# Patient Record
Sex: FEMALE | Race: WHITE | NOT HISPANIC OR LATINO | Employment: OTHER | URBAN - METROPOLITAN AREA
[De-identification: names, ages, dates, MRNs, and addresses within clinical notes are randomized per-mention and may not be internally consistent; named-entity substitution may affect disease eponyms.]

---

## 2017-05-02 ENCOUNTER — HOSPITAL ENCOUNTER (OUTPATIENT)
Dept: PAIN MEDICINE | Facility: HOSPITAL | Age: 62
Discharge: HOME | End: 2017-05-02
Attending: PHYSICAL MEDICINE & REHABILITATION | Admitting: PHYSICAL MEDICINE & REHABILITATION

## 2017-05-02 LAB
AMPHETAMINES UR QL SCN: NEGATIVE
BZE UR QL SCN: NEGATIVE
CREAT 24H UR-MCNC: NORMAL MG/DL
METHADONE UR QL SCN: NEGATIVE
OPIATE CONFIRMATION URINE: NORMAL
THC SERPLBLD CFM-MCNC: NEGATIVE NG/ML

## 2017-05-30 ENCOUNTER — HOSPITAL ENCOUNTER (OUTPATIENT)
Dept: PAIN MEDICINE | Facility: HOSPITAL | Age: 62
Discharge: HOME | End: 2017-05-30
Attending: PHYSICAL MEDICINE & REHABILITATION | Admitting: PHYSICAL MEDICINE & REHABILITATION

## 2017-07-18 ENCOUNTER — HOSPITAL ENCOUNTER (OUTPATIENT)
Dept: PAIN MEDICINE | Facility: HOSPITAL | Age: 62
Discharge: HOME | End: 2017-07-18
Attending: PHYSICAL MEDICINE & REHABILITATION | Admitting: PHYSICAL MEDICINE & REHABILITATION

## 2017-09-12 ENCOUNTER — HOSPITAL ENCOUNTER (OUTPATIENT)
Dept: PAIN MEDICINE | Facility: HOSPITAL | Age: 62
Discharge: HOME | End: 2017-09-12
Attending: PHYSICAL MEDICINE & REHABILITATION | Admitting: PHYSICAL MEDICINE & REHABILITATION

## 2017-11-21 ENCOUNTER — HOSPITAL ENCOUNTER (OUTPATIENT)
Dept: PAIN MEDICINE | Facility: HOSPITAL | Age: 62
Discharge: HOME | End: 2017-11-21
Attending: PHYSICAL MEDICINE & REHABILITATION | Admitting: PHYSICAL MEDICINE & REHABILITATION

## 2018-01-30 ENCOUNTER — HOSPITAL ENCOUNTER (OUTPATIENT)
Dept: PAIN MEDICINE | Facility: HOSPITAL | Age: 63
Discharge: HOME | End: 2018-01-30
Attending: PHYSICAL MEDICINE & REHABILITATION | Admitting: PHYSICAL MEDICINE & REHABILITATION

## 2018-04-03 ENCOUNTER — HOSPITAL ENCOUNTER (OUTPATIENT)
Dept: PAIN MEDICINE | Facility: HOSPITAL | Age: 63
Discharge: HOME | End: 2018-04-03
Attending: PHYSICAL MEDICINE & REHABILITATION | Admitting: PHYSICAL MEDICINE & REHABILITATION

## 2018-06-05 ENCOUNTER — HOSPITAL ENCOUNTER (OUTPATIENT)
Dept: PAIN MEDICINE | Facility: HOSPITAL | Age: 63
Discharge: HOME | End: 2018-06-05
Attending: PHYSICAL MEDICINE & REHABILITATION | Admitting: PHYSICAL MEDICINE & REHABILITATION

## 2018-08-02 ENCOUNTER — HOSPITAL ENCOUNTER (OUTPATIENT)
Dept: PAIN MEDICINE | Facility: HOSPITAL | Age: 63
Discharge: HOME | End: 2018-08-02
Attending: PHYSICAL MEDICINE & REHABILITATION | Admitting: PHYSICAL MEDICINE & REHABILITATION

## 2018-10-04 ENCOUNTER — HOSPITAL ENCOUNTER (OUTPATIENT)
Dept: PAIN MEDICINE | Facility: HOSPITAL | Age: 63
Discharge: HOME | End: 2018-10-04
Attending: PHYSICAL MEDICINE & REHABILITATION | Admitting: PHYSICAL MEDICINE & REHABILITATION

## 2018-10-04 LAB
AMPHETAMINES UR QL SCN: NEGATIVE
BARBITURATES UR QL SCN: NEGATIVE
BENZODIAZ UR QL SCN: NEGATIVE
BZE UR QL SCN: NEGATIVE
CREAT 24H UR-MCNC: 26.8 MG/DL
METHADONE UR QL SCN: NEGATIVE
OPIATE CONFIRMATION URINE: ABNORMAL
OPIATES TESTED UR SCN: ABNORMAL
PCP UR QL: NEGATIVE
THC SERPLBLD CFM-MCNC: NEGATIVE NG/ML

## 2019-01-03 ENCOUNTER — HOSPITAL ENCOUNTER (OUTPATIENT)
Dept: PAIN MEDICINE | Facility: HOSPITAL | Age: 64
Discharge: HOME | End: 2019-01-03
Attending: PHYSICAL MEDICINE & REHABILITATION | Admitting: PHYSICAL MEDICINE & REHABILITATION

## 2019-04-01 ENCOUNTER — HOSPITAL ENCOUNTER (OUTPATIENT)
Dept: PAIN MEDICINE | Facility: HOSPITAL | Age: 64
Discharge: HOME | End: 2019-04-01
Attending: PHYSICAL MEDICINE & REHABILITATION | Admitting: PHYSICAL MEDICINE & REHABILITATION

## 2019-07-08 ENCOUNTER — OFFICE VISIT (OUTPATIENT)
Dept: PAIN MEDICINE | Facility: CLINIC | Age: 64
End: 2019-07-08

## 2019-07-08 VITALS
BODY MASS INDEX: 24.44 KG/M2 | RESPIRATION RATE: 16 BRPM | TEMPERATURE: 98.1 F | DIASTOLIC BLOOD PRESSURE: 76 MMHG | OXYGEN SATURATION: 97 % | SYSTOLIC BLOOD PRESSURE: 126 MMHG | HEART RATE: 53 BPM | WEIGHT: 165 LBS | HEIGHT: 69 IN

## 2019-07-08 DIAGNOSIS — G62.9 PERIPHERAL POLYNEUROPATHY: ICD-10-CM

## 2019-07-08 DIAGNOSIS — M53.3 SACROILIAC DYSFUNCTION: Primary | ICD-10-CM

## 2019-07-08 DIAGNOSIS — M79.672 PAIN IN BOTH FEET: ICD-10-CM

## 2019-07-08 DIAGNOSIS — Z79.899 OTHER LONG TERM (CURRENT) DRUG THERAPY: ICD-10-CM

## 2019-07-08 DIAGNOSIS — M79.604 LEG PAIN, BILATERAL: ICD-10-CM

## 2019-07-08 DIAGNOSIS — M70.62 GREATER TROCHANTERIC BURSITIS OF BOTH HIPS: ICD-10-CM

## 2019-07-08 DIAGNOSIS — M70.61 GREATER TROCHANTERIC BURSITIS OF BOTH HIPS: ICD-10-CM

## 2019-07-08 DIAGNOSIS — M79.671 PAIN IN BOTH FEET: ICD-10-CM

## 2019-07-08 DIAGNOSIS — M79.605 LEG PAIN, BILATERAL: ICD-10-CM

## 2019-07-08 PROBLEM — M79.673 PAIN OF FOOT: Status: ACTIVE | Noted: 2017-05-02

## 2019-07-08 PROCEDURE — 99213 OFFICE O/P EST LOW 20 MIN: CPT | Performed by: PHYSICAL MEDICINE & REHABILITATION

## 2019-07-08 PROCEDURE — G0463 HOSPITAL OUTPT CLINIC VISIT: HCPCS | Performed by: PHYSICAL MEDICINE & REHABILITATION

## 2019-07-08 RX ORDER — HYDROCODONE BITARTRATE AND ACETAMINOPHEN 10; 325 MG/1; MG/1
1 TABLET ORAL EVERY 6 HOURS PRN
Qty: 120 TABLET | Refills: 0 | Status: SHIPPED | OUTPATIENT
Start: 2019-07-08 | End: 2019-07-08 | Stop reason: SDUPTHER

## 2019-07-08 RX ORDER — CITALOPRAM 20 MG/1
TABLET ORAL
COMMUNITY
Start: 2019-07-05

## 2019-07-08 RX ORDER — PROMETHAZINE HYDROCHLORIDE 25 MG/1
25 TABLET ORAL
COMMUNITY
End: 2019-09-10

## 2019-07-08 RX ORDER — HYDROCODONE BITARTRATE AND ACETAMINOPHEN 10; 325 MG/1; MG/1
1 TABLET ORAL EVERY 6 HOURS PRN
Qty: 120 TABLET | Refills: 0 | Status: SHIPPED | OUTPATIENT
Start: 2019-07-08 | End: 2019-09-10 | Stop reason: SDUPTHER

## 2019-07-08 RX ORDER — BIOTIN 2500 MCG
CAPSULE ORAL
COMMUNITY
Start: 2017-04-27

## 2019-07-08 RX ORDER — DULOXETIN HYDROCHLORIDE 60 MG/1
CAPSULE, DELAYED RELEASE ORAL
COMMUNITY
Start: 2017-04-27

## 2019-07-08 RX ORDER — SODIUM CHLORIDE 5 %
OINTMENT (GRAM) OPHTHALMIC (EYE)
COMMUNITY
Start: 2018-06-05

## 2019-07-08 RX ORDER — GABAPENTIN 600 MG/1
TABLET ORAL
COMMUNITY
Start: 2017-04-27 | End: 2021-02-26 | Stop reason: SDUPTHER

## 2019-07-08 RX ORDER — SIMETHICONE 125 MG
CAPSULE ORAL
COMMUNITY
End: 2019-11-26 | Stop reason: ALTCHOICE

## 2019-07-08 RX ORDER — SALICYLIC ACID
POWDER (GRAM) MISCELLANEOUS ONCE
COMMUNITY
End: 2019-11-26 | Stop reason: ALTCHOICE

## 2019-07-08 RX ORDER — HYDROCODONE BITARTRATE AND ACETAMINOPHEN 10; 325 MG/1; MG/1
TABLET ORAL
COMMUNITY
Start: 2017-04-27 | End: 2019-09-10

## 2019-07-08 RX ORDER — OMEPRAZOLE 40 MG/1
CAPSULE, DELAYED RELEASE ORAL
COMMUNITY
Start: 2017-04-27 | End: 2021-08-27

## 2019-07-08 NOTE — PROGRESS NOTES
Subjective   Angella Srivastava is a 64 y.o. female.     Bilateral feet pain, also posterior leg pain, LLE > RLE. Dz with peripheral neuropathy in 2005, 10/10 at worst, 5/10 at best, 3/10 today, burning, cold, pins & needles, always present, varies, worst in morning and at night, interferes with all activities that involve walking. Had NCS with PN. Started on Darvocet in past, taking Norco 10/325mg about QID prn with benefit at initial visit. Lyrica helped but caused severe cramping, taking Gabapentin 600mg QID at initial visit, also Cymbalta. Referred for pain management. Continued Norco, Gabapentin at 1200mg TID. Norco not lasting 6 hours, but she prefers to stay with Norco. Started CBD oil, helps thumb pain.          The following portions of the patient's history were reviewed and updated as appropriate: allergies, current medications, past family history, past medical history, past social history, past surgical history and problem list.    Review of Systems   Constitutional: Negative for chills, fatigue and fever.   HENT: Negative for hearing loss and trouble swallowing.    Eyes: Negative for visual disturbance.   Respiratory: Negative for shortness of breath.    Cardiovascular: Negative for chest pain.   Gastrointestinal: Negative for abdominal pain, constipation, diarrhea, nausea and vomiting.   Genitourinary: Negative for urinary incontinence.   Musculoskeletal: Positive for arthralgias and back pain. Negative for joint swelling, myalgias and neck pain.   Neurological: Positive for weakness and numbness. Negative for dizziness and headache.       Objective   Physical Exam   Constitutional: She is oriented to person, place, and time. She appears well-developed and well-nourished.   HENT:   Head: Normocephalic and atraumatic.   Eyes: EOM are normal. Pupils are equal, round, and reactive to light.   Neck: Normal range of motion.   Cardiovascular: Normal rate, regular rhythm, normal heart sounds and intact distal  pulses.   Pulmonary/Chest: Breath sounds normal.   Abdominal: Soft. Bowel sounds are normal. She exhibits no distension. There is no tenderness.   Neurological: She is alert and oriented to person, place, and time. She has normal strength and normal reflexes. She displays normal reflexes. A sensory deficit is present.   Decreased in stocking distribution up to mid-calf b/l   Psychiatric: She has a normal mood and affect. Her behavior is normal. Thought content normal.         Assessment/Plan   Angella was seen today for pain.    Diagnoses and all orders for this visit:    Sacroiliac dysfunction    Greater trochanteric bursitis of both hips    Pain in both feet    Leg pain, bilateral    Peripheral polyneuropathy        INspect reviewed, in order. Low risk per SOAPP. Repeat UDS 10/4/18 in order.  Cont Norco 10/325mg QID prn, discussed rotating to Zohydro, she will consider. Cannot tolerate Morphine, Codeine, Oxycodone.  Cont Gabapentin 1200mg TID, cannot tolerate Lyrica, written by PCP.  Cont RxAlt #2 cream, helps.  Cont Cymbalta.  [facility] records unavailable.  RTC 3 months for f/u. Will print 3rd script in 2 months without an appt. Stable.

## 2019-09-10 ENCOUNTER — OFFICE VISIT (OUTPATIENT)
Dept: PAIN MEDICINE | Facility: CLINIC | Age: 64
End: 2019-09-10

## 2019-09-10 VITALS
BODY MASS INDEX: 24.14 KG/M2 | HEIGHT: 69 IN | HEART RATE: 86 BPM | WEIGHT: 163 LBS | TEMPERATURE: 98.2 F | RESPIRATION RATE: 16 BRPM | DIASTOLIC BLOOD PRESSURE: 98 MMHG | SYSTOLIC BLOOD PRESSURE: 161 MMHG | OXYGEN SATURATION: 98 %

## 2019-09-10 DIAGNOSIS — M79.605 LEG PAIN, BILATERAL: ICD-10-CM

## 2019-09-10 DIAGNOSIS — M70.62 GREATER TROCHANTERIC BURSITIS OF BOTH HIPS: ICD-10-CM

## 2019-09-10 DIAGNOSIS — M53.3 SACROILIAC DYSFUNCTION: ICD-10-CM

## 2019-09-10 DIAGNOSIS — M79.671 PAIN IN BOTH FEET: Primary | ICD-10-CM

## 2019-09-10 DIAGNOSIS — M70.61 GREATER TROCHANTERIC BURSITIS OF BOTH HIPS: ICD-10-CM

## 2019-09-10 DIAGNOSIS — G62.9 PERIPHERAL POLYNEUROPATHY: ICD-10-CM

## 2019-09-10 DIAGNOSIS — M79.672 PAIN IN BOTH FEET: Primary | ICD-10-CM

## 2019-09-10 DIAGNOSIS — M79.604 LEG PAIN, BILATERAL: ICD-10-CM

## 2019-09-10 DIAGNOSIS — Z79.899 OTHER LONG TERM (CURRENT) DRUG THERAPY: ICD-10-CM

## 2019-09-10 PROCEDURE — 99214 OFFICE O/P EST MOD 30 MIN: CPT | Performed by: PHYSICAL MEDICINE & REHABILITATION

## 2019-09-10 PROCEDURE — G0463 HOSPITAL OUTPT CLINIC VISIT: HCPCS | Performed by: PHYSICAL MEDICINE & REHABILITATION

## 2019-09-10 RX ORDER — HYDROCODONE BITARTRATE AND ACETAMINOPHEN 10; 325 MG/1; MG/1
1 TABLET ORAL EVERY 6 HOURS PRN
Qty: 120 TABLET | Refills: 0 | Status: SHIPPED | OUTPATIENT
Start: 2019-09-10 | End: 2019-11-07 | Stop reason: SDUPTHER

## 2019-09-10 RX ORDER — HYDROCODONE BITARTRATE AND ACETAMINOPHEN 10; 325 MG/1; MG/1
1 TABLET ORAL EVERY 6 HOURS PRN
Qty: 120 TABLET | Refills: 0 | Status: SHIPPED | OUTPATIENT
Start: 2019-09-10 | End: 2019-09-10 | Stop reason: SDUPTHER

## 2019-09-10 RX ORDER — PROMETHAZINE HYDROCHLORIDE 25 MG/1
25 TABLET ORAL EVERY 8 HOURS PRN
Qty: 90 TABLET | Refills: 11 | Status: SHIPPED | OUTPATIENT
Start: 2019-09-10

## 2019-09-10 NOTE — PROGRESS NOTES
Subjective   Angella Srivastava is a 64 y.o. female.     Bilateral feet pain, also posterior leg pain, LLE > RLE. Dz with peripheral neuropathy in 2005, 10/10 at worst, 5/10 at best, burning, cold, pins & needles, always present, varies, worst in morning and at night, interferes with all activities that involve walking. Had NCS with PN. Started on Darvocet in past, taking Norco 10/325mg about QID prn with benefit at initial visit. Lyrica helped but caused severe cramping, taking Gabapentin 600mg QID at initial visit, also Cymbalta. Referred for pain management. Continued Norco, Gabapentin at 1200mg TID. Norco not lasting 6 hours, but she prefers to stay with Norco. Started CBD oil, helps thumb pain. Worsening nausea.         The following portions of the patient's history were reviewed and updated as appropriate: allergies, current medications, past family history, past medical history, past social history, past surgical history and problem list.    Review of Systems   Constitutional: Negative for chills, fatigue and fever.   HENT: Negative for hearing loss and trouble swallowing.    Eyes: Negative for visual disturbance.   Respiratory: Negative for shortness of breath.    Cardiovascular: Negative for chest pain.   Gastrointestinal: Positive for nausea. Negative for abdominal pain, constipation, diarrhea and vomiting.   Genitourinary: Negative for urinary incontinence.   Musculoskeletal: Positive for arthralgias and back pain. Negative for joint swelling, myalgias and neck pain.   Neurological: Positive for weakness and numbness. Negative for dizziness and headache.       Objective   Physical Exam   Constitutional: She is oriented to person, place, and time. She appears well-developed and well-nourished.   HENT:   Head: Normocephalic and atraumatic.   Eyes: EOM are normal. Pupils are equal, round, and reactive to light.   Neck: Normal range of motion.   Cardiovascular: Normal rate, regular rhythm, normal heart sounds  and intact distal pulses.   Pulmonary/Chest: Breath sounds normal.   Abdominal: Soft. Bowel sounds are normal. She exhibits no distension. There is no tenderness.   Neurological: She is alert and oriented to person, place, and time. She has normal strength and normal reflexes. She displays normal reflexes. A sensory deficit is present.   Decreased in stocking distribution up to mid-calf b/l   Psychiatric: She has a normal mood and affect. Her behavior is normal. Thought content normal.         Assessment/Plan   Angella was seen today for foot pain and leg pain.    Diagnoses and all orders for this visit:    Pain in both feet    Leg pain, bilateral    Peripheral polyneuropathy    Sacroiliac dysfunction    Greater trochanteric bursitis of both hips    Other long term (current) drug therapy        INspect reviewed, in order. Low risk per SOAPP. Repeat UDS 10/4/18 in order.  Cont Norco 10/325mg QID prn, discussed rotating to Zohydro, she will consider. Cannot tolerate Morphine, Codeine, Oxycodone.  Cont Gabapentin 1200mg TID, cannot tolerate Lyrica, written by PCP.  Cont RxAlt #2 cream, helps.  Cont Cymbalta.  Begin Phenergan 25mg TID prn.  [facility] records unavailable.  RTC 3 months for f/u. Will print 3rd script in 2 months without an appt. Stable.

## 2019-11-07 RX ORDER — HYDROCODONE BITARTRATE AND ACETAMINOPHEN 10; 325 MG/1; MG/1
1 TABLET ORAL EVERY 6 HOURS PRN
Qty: 120 TABLET | Refills: 0 | Status: SHIPPED | OUTPATIENT
Start: 2019-11-07 | End: 2019-11-26 | Stop reason: SDUPTHER

## 2019-11-26 ENCOUNTER — RESULTS ENCOUNTER (OUTPATIENT)
Dept: PAIN MEDICINE | Facility: HOSPITAL | Age: 64
End: 2019-11-26

## 2019-11-26 ENCOUNTER — OFFICE VISIT (OUTPATIENT)
Dept: PAIN MEDICINE | Facility: CLINIC | Age: 64
End: 2019-11-26

## 2019-11-26 VITALS
HEIGHT: 68 IN | BODY MASS INDEX: 25.01 KG/M2 | HEART RATE: 76 BPM | WEIGHT: 165 LBS | DIASTOLIC BLOOD PRESSURE: 82 MMHG | TEMPERATURE: 98.2 F | RESPIRATION RATE: 16 BRPM | SYSTOLIC BLOOD PRESSURE: 129 MMHG

## 2019-11-26 DIAGNOSIS — G62.9 PERIPHERAL POLYNEUROPATHY: ICD-10-CM

## 2019-11-26 DIAGNOSIS — Z79.899 OTHER LONG TERM (CURRENT) DRUG THERAPY: ICD-10-CM

## 2019-11-26 DIAGNOSIS — F19.90 CURRENT DRUG USE: Primary | ICD-10-CM

## 2019-11-26 DIAGNOSIS — M79.671 PAIN IN BOTH FEET: Primary | ICD-10-CM

## 2019-11-26 DIAGNOSIS — F19.90 CURRENT DRUG USE: ICD-10-CM

## 2019-11-26 DIAGNOSIS — M70.62 GREATER TROCHANTERIC BURSITIS OF BOTH HIPS: ICD-10-CM

## 2019-11-26 DIAGNOSIS — M79.605 LEG PAIN, BILATERAL: ICD-10-CM

## 2019-11-26 DIAGNOSIS — M53.3 SACROILIAC DYSFUNCTION: ICD-10-CM

## 2019-11-26 DIAGNOSIS — M79.604 LEG PAIN, BILATERAL: ICD-10-CM

## 2019-11-26 DIAGNOSIS — M70.61 GREATER TROCHANTERIC BURSITIS OF BOTH HIPS: ICD-10-CM

## 2019-11-26 DIAGNOSIS — M79.672 PAIN IN BOTH FEET: Primary | ICD-10-CM

## 2019-11-26 PROCEDURE — G0463 HOSPITAL OUTPT CLINIC VISIT: HCPCS | Performed by: PHYSICAL MEDICINE & REHABILITATION

## 2019-11-26 PROCEDURE — 99213 OFFICE O/P EST LOW 20 MIN: CPT | Performed by: PHYSICAL MEDICINE & REHABILITATION

## 2019-11-26 RX ORDER — HYDROCODONE BITARTRATE AND ACETAMINOPHEN 10; 325 MG/1; MG/1
1 TABLET ORAL EVERY 6 HOURS PRN
Qty: 120 TABLET | Refills: 0 | Status: SHIPPED | OUTPATIENT
Start: 2019-11-26 | End: 2019-11-26 | Stop reason: SDUPTHER

## 2019-11-26 RX ORDER — HYDROCODONE BITARTRATE AND ACETAMINOPHEN 10; 325 MG/1; MG/1
1 TABLET ORAL EVERY 6 HOURS PRN
Qty: 120 TABLET | Refills: 0 | Status: SHIPPED | OUTPATIENT
Start: 2019-11-26 | End: 2020-02-04

## 2019-11-26 NOTE — PROGRESS NOTES
Subjective   Angella Srivastava is a 64 y.o. female.     Bilateral feet pain, also posterior leg pain, LLE > RLE. Dz with peripheral neuropathy in 2005, 10/10 at worst, 5/10 at best, burning, cold, pins & needles, always present, varies, worst in morning and at night, interferes with all activities that involve walking. Had NCS with PN. Started on Darvocet in past, taking Norco 10/325mg about QID prn with benefit at initial visit. Lyrica helped but caused severe cramping, taking Gabapentin 600mg QID at initial visit, also Cymbalta. Referred for pain management. Continued Norco, Gabapentin at 1200mg TID. Norco not lasting 6 hours, but she prefers to stay with Norco. Started CBD oil, helps thumb pain. Worsening nausea.         The following portions of the patient's history were reviewed and updated as appropriate: allergies, current medications, past family history, past medical history, past social history, past surgical history and problem list.    Review of Systems   Constitutional: Negative for chills, fatigue and fever.   HENT: Negative for hearing loss and trouble swallowing.    Eyes: Negative for visual disturbance.   Respiratory: Negative for shortness of breath.    Cardiovascular: Negative for chest pain.   Gastrointestinal: Positive for nausea. Negative for abdominal pain, constipation, diarrhea and vomiting.   Genitourinary: Negative for urinary incontinence.   Musculoskeletal: Positive for arthralgias and back pain. Negative for joint swelling, myalgias and neck pain.   Neurological: Positive for weakness and numbness. Negative for dizziness and headache.       Objective   Physical Exam   Constitutional: She is oriented to person, place, and time. She appears well-developed and well-nourished.   HENT:   Head: Normocephalic and atraumatic.   Eyes: EOM are normal. Pupils are equal, round, and reactive to light.   Neck: Normal range of motion.   Cardiovascular: Normal rate, regular rhythm, normal heart sounds  and intact distal pulses.   Pulmonary/Chest: Breath sounds normal.   Abdominal: Soft. Bowel sounds are normal. She exhibits no distension. There is no tenderness.   Neurological: She is alert and oriented to person, place, and time. She has normal strength and normal reflexes. She displays normal reflexes. A sensory deficit is present.   Decreased in stocking distribution up to mid-calf b/l   Psychiatric: She has a normal mood and affect. Her behavior is normal. Thought content normal.         Assessment/Plan   Angella was seen today for foot pain.    Diagnoses and all orders for this visit:    Pain in both feet    Leg pain, bilateral    Peripheral polyneuropathy    Sacroiliac dysfunction    Greater trochanteric bursitis of both hips    Other long term (current) drug therapy        INspect and Martin reviewed, in order. Low risk per SOAPP. Repeat UDS 10/4/18 in order.  Cont Norco 10/325mg QID prn, discussed rotating to Zohydro, she will consider. Cannot tolerate Morphine, Codeine, Oxycodone.  Cont Gabapentin 1200mg TID, cannot tolerate Lyrica, written by PCP.  Cont RxAlt #2 cream, helps.  Cont Cymbalta.  Began Phenergan 25mg TID prn.  [facility] records unavailable.  RTC 3 months for f/u. Will print 3rd script in 2 months without an appt. Stable.

## 2020-02-04 ENCOUNTER — OFFICE VISIT (OUTPATIENT)
Dept: PAIN MEDICINE | Facility: CLINIC | Age: 65
End: 2020-02-04

## 2020-02-04 VITALS
BODY MASS INDEX: 24.44 KG/M2 | HEIGHT: 69 IN | TEMPERATURE: 98.3 F | WEIGHT: 165 LBS | RESPIRATION RATE: 16 BRPM | SYSTOLIC BLOOD PRESSURE: 126 MMHG | DIASTOLIC BLOOD PRESSURE: 81 MMHG | HEART RATE: 86 BPM | OXYGEN SATURATION: 98 %

## 2020-02-04 DIAGNOSIS — M53.3 SACROILIAC DYSFUNCTION: ICD-10-CM

## 2020-02-04 DIAGNOSIS — M79.604 LEG PAIN, BILATERAL: Primary | ICD-10-CM

## 2020-02-04 DIAGNOSIS — M79.605 LEG PAIN, BILATERAL: Primary | ICD-10-CM

## 2020-02-04 DIAGNOSIS — Z79.899 OTHER LONG TERM (CURRENT) DRUG THERAPY: ICD-10-CM

## 2020-02-04 DIAGNOSIS — M70.62 GREATER TROCHANTERIC BURSITIS OF BOTH HIPS: ICD-10-CM

## 2020-02-04 DIAGNOSIS — M79.671 PAIN IN BOTH FEET: ICD-10-CM

## 2020-02-04 DIAGNOSIS — M79.672 PAIN IN BOTH FEET: ICD-10-CM

## 2020-02-04 DIAGNOSIS — G62.9 PERIPHERAL POLYNEUROPATHY: ICD-10-CM

## 2020-02-04 DIAGNOSIS — M70.61 GREATER TROCHANTERIC BURSITIS OF BOTH HIPS: ICD-10-CM

## 2020-02-04 PROCEDURE — G0463 HOSPITAL OUTPT CLINIC VISIT: HCPCS | Performed by: PHYSICAL MEDICINE & REHABILITATION

## 2020-02-04 PROCEDURE — 99214 OFFICE O/P EST MOD 30 MIN: CPT | Performed by: PHYSICAL MEDICINE & REHABILITATION

## 2020-02-04 RX ORDER — HYDROCODONE BITARTRATE 40 MG/1
1 TABLET, EXTENDED RELEASE ORAL DAILY
Qty: 30 EACH | Refills: 0 | Status: SHIPPED | OUTPATIENT
Start: 2020-02-04 | End: 2020-03-24 | Stop reason: SDUPTHER

## 2020-02-04 RX ORDER — HYDROCODONE BITARTRATE 40 MG/1
1 TABLET, EXTENDED RELEASE ORAL DAILY
Qty: 30 EACH | Refills: 0 | Status: SHIPPED | OUTPATIENT
Start: 2020-02-04 | End: 2020-02-04 | Stop reason: SDUPTHER

## 2020-02-04 NOTE — PROGRESS NOTES
Subjective   Angella Srivastava is a 64 y.o. female.     Bilateral feet pain, also posterior leg pain, LLE > RLE. Dz with peripheral neuropathy in 2005, 10/10 at worst, 5/10 at best, burning, cold, pins & needles, always present, varies, worst in morning and at night, interferes with all activities that involve walking. Had NCS with PN. Started on Darvocet in past, taking Norco 10/325mg about QID prn with benefit at initial visit. Lyrica helped but caused severe cramping, taking Gabapentin 600mg QID at initial visit, also Cymbalta. Referred for pain management. Continued Norco, Gabapentin at 1200mg TID. Norco not lasting 6 hours, but she prefers to stay with Norco. Started CBD oil, helps thumb pain. Worsening nausea.       The following portions of the patient's history were reviewed and updated as appropriate: allergies, current medications, past family history, past medical history, past social history, past surgical history and problem list.    Review of Systems   Constitutional: Negative for chills, fatigue and fever.   HENT: Negative for hearing loss and trouble swallowing.    Eyes: Negative for visual disturbance.   Respiratory: Negative for shortness of breath.    Cardiovascular: Negative for chest pain.   Gastrointestinal: Positive for nausea. Negative for abdominal pain, constipation, diarrhea and vomiting.   Genitourinary: Negative for urinary incontinence.   Musculoskeletal: Positive for arthralgias and back pain. Negative for joint swelling, myalgias and neck pain.   Neurological: Positive for weakness and numbness. Negative for dizziness and headache.       Objective   Physical Exam   Constitutional: She is oriented to person, place, and time. She appears well-developed and well-nourished.   HENT:   Head: Normocephalic and atraumatic.   Eyes: Pupils are equal, round, and reactive to light. EOM are normal.   Neck: Normal range of motion.   Cardiovascular: Normal rate, regular rhythm, normal heart sounds  and intact distal pulses.   Pulmonary/Chest: Breath sounds normal.   Abdominal: Soft. Bowel sounds are normal. She exhibits no distension. There is no tenderness.   Neurological: She is alert and oriented to person, place, and time. She has normal strength and normal reflexes. She displays normal reflexes. A sensory deficit is present.   Decreased in stocking distribution up to mid-calf b/l   Psychiatric: She has a normal mood and affect. Her behavior is normal. Thought content normal.         Assessment/Plan   Angella was seen today for leg pain.    Diagnoses and all orders for this visit:    Leg pain, bilateral    Peripheral polyneuropathy    Greater trochanteric bursitis of both hips    Sacroiliac dysfunction    Other long term (current) drug therapy    Pain in both feet        INspect and Martin reviewed, in order. Low risk per SOAPP. Repeat UDS 11/26/20 in order.  Stop Norco 10/325mg QID prn, rotate to Hysingla 40mg qdaily. Cannot tolerate Morphine, Codeine, Oxycodone.  Cont Gabapentin 1200mg TID, cannot tolerate Lyrica, written by PCP.  Cont RxAlt #2 cream, helps.  Cont Cymbalta.  Began Phenergan 25mg TID prn.  [facility] records unavailable.  RTC 3 months for f/u. Will print 3rd script in 2 months without an appt. Stable.

## 2020-03-19 ENCOUNTER — TELEPHONE (OUTPATIENT)
Dept: PAIN MEDICINE | Facility: CLINIC | Age: 65
End: 2020-03-19

## 2020-03-19 DIAGNOSIS — M79.671 PAIN IN BOTH FEET: ICD-10-CM

## 2020-03-19 DIAGNOSIS — M79.672 PAIN IN BOTH FEET: ICD-10-CM

## 2020-03-24 ENCOUNTER — TELEPHONE (OUTPATIENT)
Dept: PAIN MEDICINE | Facility: CLINIC | Age: 65
End: 2020-03-24

## 2020-03-24 RX ORDER — HYDROCODONE BITARTRATE 40 MG/1
1 TABLET, EXTENDED RELEASE ORAL DAILY
Qty: 30 EACH | Refills: 0 | Status: SHIPPED | OUTPATIENT
Start: 2020-03-24 | End: 2020-05-05 | Stop reason: SDUPTHER

## 2020-05-05 ENCOUNTER — OFFICE VISIT (OUTPATIENT)
Dept: PAIN MEDICINE | Facility: CLINIC | Age: 65
End: 2020-05-05

## 2020-05-05 ENCOUNTER — TELEPHONE (OUTPATIENT)
Dept: PAIN MEDICINE | Facility: CLINIC | Age: 65
End: 2020-05-05

## 2020-05-05 VITALS — WEIGHT: 165 LBS | BODY MASS INDEX: 24.44 KG/M2 | HEIGHT: 69 IN

## 2020-05-05 DIAGNOSIS — R39.89 BLADDER PAIN: ICD-10-CM

## 2020-05-05 DIAGNOSIS — M70.62 GREATER TROCHANTERIC BURSITIS OF BOTH HIPS: ICD-10-CM

## 2020-05-05 DIAGNOSIS — M79.605 LEG PAIN, BILATERAL: Primary | ICD-10-CM

## 2020-05-05 DIAGNOSIS — M70.61 GREATER TROCHANTERIC BURSITIS OF BOTH HIPS: ICD-10-CM

## 2020-05-05 DIAGNOSIS — M53.3 SACROILIAC DYSFUNCTION: ICD-10-CM

## 2020-05-05 DIAGNOSIS — M79.672 PAIN IN BOTH FEET: ICD-10-CM

## 2020-05-05 DIAGNOSIS — G62.9 PERIPHERAL POLYNEUROPATHY: ICD-10-CM

## 2020-05-05 DIAGNOSIS — Z79.899 OTHER LONG TERM (CURRENT) DRUG THERAPY: ICD-10-CM

## 2020-05-05 DIAGNOSIS — M79.671 PAIN IN BOTH FEET: ICD-10-CM

## 2020-05-05 DIAGNOSIS — M79.604 LEG PAIN, BILATERAL: Primary | ICD-10-CM

## 2020-05-05 PROCEDURE — 99441 PR PHYS/QHP TELEPHONE EVALUATION 5-10 MIN: CPT | Performed by: PHYSICAL MEDICINE & REHABILITATION

## 2020-05-05 RX ORDER — HYDROCODONE BITARTRATE 40 MG/1
1 TABLET, EXTENDED RELEASE ORAL DAILY
Qty: 30 EACH | Refills: 0 | Status: SHIPPED | OUTPATIENT
Start: 2020-05-05 | End: 2020-05-05

## 2020-05-05 NOTE — TELEPHONE ENCOUNTER
Yes, let's do that. Script for Zohydro 20mg BID sent instead. Can you please let Hilary from [facility] in [place] know asap?

## 2020-05-05 NOTE — TELEPHONE ENCOUNTER
Ok so debby from [facility] in [place] just called and wants to know if you want to switch pt to zohdyro since there's a generic?? She would like an answer asap she says.  They will need an rx sent in if you are changing it

## 2020-05-05 NOTE — PROGRESS NOTES
Subjective   Angella Srivastava is a 65 y.o. female.     Bilateral feet pain, also posterior leg pain, LLE > RLE. Dz with peripheral neuropathy in 2005, 10/10 at worst, 5/10 at best, burning, cold, pins & needles, always present, varies, worst in morning and at night, interferes with all activities that involve walking. Had NCS with PN. Started on Darvocet in past, taking Norco 10/325mg about QID prn with benefit at initial visit. Lyrica helped but caused severe cramping, taking Gabapentin 600mg QID at initial visit, also Cymbalta. Referred for pain management. Continued Norco, Gabapentin at 1200mg TID. Norco not lasting 6 hours, but she prefers to stay with Norco. Started CBD oil, helps thumb pain. Worsening nausea.       The following portions of the patient's history were reviewed and updated as appropriate: allergies, current medications, past family history, past medical history, past social history, past surgical history and problem list.    Review of Systems   Constitutional: Negative for chills, fatigue and fever.   HENT: Negative for hearing loss and trouble swallowing.    Eyes: Negative for visual disturbance.   Respiratory: Negative for shortness of breath.    Cardiovascular: Negative for chest pain.   Gastrointestinal: Positive for nausea. Negative for abdominal pain, constipation, diarrhea and vomiting.   Genitourinary: Negative for urinary incontinence.   Musculoskeletal: Positive for arthralgias and back pain. Negative for joint swelling, myalgias and neck pain.   Neurological: Positive for weakness and numbness. Negative for dizziness and headache.       Objective   Physical Exam   Constitutional: She is oriented to person, place, and time.   Neurological: She is alert and oriented to person, place, and time. She has normal strength and normal reflexes.   Psychiatric: She has a normal mood and affect. Her behavior is normal. Thought content normal.         Assessment/Plan   Diagnoses and all orders for  this visit:    Leg pain, bilateral    Pain in both feet    Peripheral polyneuropathy    Greater trochanteric bursitis of both hips    Sacroiliac dysfunction    Other long term (current) drug therapy    Bladder pain        INspect and Martin reviewed, in order, filled 4/8/20. Low risk per SOAPP. Repeat UDS 11/26/19 in order.  Stopped Norco 10/325mg QID prn, rotated to Hysingla 40mg qdaily, working pretty well but generic not lasting 24h like brand name. Cannot tolerate Morphine, Codeine, Oxycodone.  Cont Gabapentin 1200mg TID, cannot tolerate Lyrica, written by PCP.  Cont RxAlt #2 cream, helps.  Cont Cymbalta.  Began Phenergan 25mg TID prn.  [facility] records unavailable.  RTC 3 months for f/u. Will print 3rd script in 2 months without an appt. Stable. Telephone visit, spent 6 minutes discussing her plan of care and meds.    ADD: Per pharmacy, patient accidentally given generic Zohydro 40mg qdaily, so unsurprising she felt it was not lasting full 24h, was $40 instead of $70, may try generic Zohydro 20mg BID next refill, there is no generic for Hysingla.

## 2020-05-05 NOTE — TELEPHONE ENCOUNTER
tae negrete [facility] in [place] called this afternoon.  Wanted to let you  Know that in April they actually filled generic zohydro instead of the hysingla.  Pt has switched to [facility] in [place].  Anyways, there is no generic for hysingla and tae wanted to tell you if the pt did ok on the generic zohydro she was accidentally given you could send a script for that.  Just fyi sir

## 2020-05-05 NOTE — TELEPHONE ENCOUNTER
Nik. She actually said it worked well but only for 18 hours, which makes sense for a BID medicine. I may try switching her to generic Zohydro 20mg BID instead. Thanks.

## 2020-06-04 NOTE — TELEPHONE ENCOUNTER
Patient called her pharmacy [facility] and she said rx is not there for this month. Inspect on your desk

## 2020-07-06 ENCOUNTER — TELEPHONE (OUTPATIENT)
Dept: PAIN MEDICINE | Facility: HOSPITAL | Age: 65
End: 2020-07-06

## 2020-07-06 NOTE — TELEPHONE ENCOUNTER
Pharmacist called & is in question if the dosage is correct on script.  Pt has been getting 40mg bid & the new script is for Zohydro 20 mg bid.  Pharmacist called pt to ask if there was a med change & pt said no.

## 2020-07-06 NOTE — TELEPHONE ENCOUNTER
Spoke with Hilary from [facility] and she now understands patient is to take zohydro 20mg bid totaling 40mg per day.

## 2020-07-06 NOTE — TELEPHONE ENCOUNTER
Called pharmacist to give her message that Dr. Manning sent.  She felt like there was still confusion, so I gave her [place] number & she will speak to Dr. Manning.

## 2020-07-06 NOTE — TELEPHONE ENCOUNTER
She was taking Hysingla 40mg qdaily, generic not working well, so we switched her to Zohydro 20mg BID, so the same total amount. Thanks.

## 2020-08-06 ENCOUNTER — OFFICE VISIT (OUTPATIENT)
Dept: PAIN MEDICINE | Facility: CLINIC | Age: 65
End: 2020-08-06

## 2020-08-06 ENCOUNTER — RESULTS ENCOUNTER (OUTPATIENT)
Dept: PAIN MEDICINE | Facility: CLINIC | Age: 65
End: 2020-08-06

## 2020-08-06 VITALS
WEIGHT: 165 LBS | OXYGEN SATURATION: 99 % | RESPIRATION RATE: 16 BRPM | DIASTOLIC BLOOD PRESSURE: 77 MMHG | BODY MASS INDEX: 24.44 KG/M2 | HEART RATE: 53 BPM | SYSTOLIC BLOOD PRESSURE: 128 MMHG | HEIGHT: 69 IN | TEMPERATURE: 96.4 F

## 2020-08-06 DIAGNOSIS — Z79.899 OTHER LONG TERM (CURRENT) DRUG THERAPY: ICD-10-CM

## 2020-08-06 DIAGNOSIS — M79.671 PAIN IN BOTH FEET: ICD-10-CM

## 2020-08-06 DIAGNOSIS — M79.605 LEG PAIN, BILATERAL: Primary | ICD-10-CM

## 2020-08-06 DIAGNOSIS — R39.89 BLADDER PAIN: ICD-10-CM

## 2020-08-06 DIAGNOSIS — M53.3 SACROILIAC DYSFUNCTION: ICD-10-CM

## 2020-08-06 DIAGNOSIS — M70.62 GREATER TROCHANTERIC BURSITIS OF BOTH HIPS: ICD-10-CM

## 2020-08-06 DIAGNOSIS — M79.604 LEG PAIN, BILATERAL: Primary | ICD-10-CM

## 2020-08-06 DIAGNOSIS — G62.9 PERIPHERAL POLYNEUROPATHY: ICD-10-CM

## 2020-08-06 DIAGNOSIS — M79.672 PAIN IN BOTH FEET: ICD-10-CM

## 2020-08-06 DIAGNOSIS — M70.61 GREATER TROCHANTERIC BURSITIS OF BOTH HIPS: ICD-10-CM

## 2020-08-06 PROCEDURE — 99214 OFFICE O/P EST MOD 30 MIN: CPT | Performed by: PHYSICAL MEDICINE & REHABILITATION

## 2020-08-06 PROCEDURE — G0463 HOSPITAL OUTPT CLINIC VISIT: HCPCS | Performed by: PHYSICAL MEDICINE & REHABILITATION

## 2020-08-06 RX ORDER — HYDROCODONE BITARTRATE 20 MG/1
TABLET, EXTENDED RELEASE ORAL
COMMUNITY
End: 2020-08-06

## 2020-08-06 NOTE — PROGRESS NOTES
Subjective   Angella Srivastava is a 65 y.o. female.     Bilateral feet pain, also posterior leg pain, LLE > RLE. Dz with peripheral neuropathy in 2005, 10/10 at worst, 5/10 at best, burning, cold, pins & needles, always present, varies, worst in morning and at night, interferes with all activities that involve walking. Had NCS with PN. Started on Darvocet in past, taking Norco 10/325mg about QID prn with benefit at initial visit. Lyrica helped but caused severe cramping, taking Gabapentin 600mg QID at initial visit, also Cymbalta. Referred for pain management. Continued Norco, Gabapentin at 1200mg TID. Norco not lasting 6 hours, but she prefers to stay with Norco. Started CBD oil, helps thumb pain. Worsening nausea.       The following portions of the patient's history were reviewed and updated as appropriate: allergies, current medications, past family history, past medical history, past social history, past surgical history and problem list.    Review of Systems   Constitutional: Negative for chills, fatigue and fever.   HENT: Negative for hearing loss and trouble swallowing.    Eyes: Negative for visual disturbance.   Respiratory: Negative for shortness of breath.    Cardiovascular: Negative for chest pain.   Gastrointestinal: Positive for nausea. Negative for abdominal pain, constipation, diarrhea and vomiting.   Genitourinary: Negative for urinary incontinence.   Musculoskeletal: Positive for arthralgias and back pain. Negative for joint swelling, myalgias and neck pain.   Neurological: Positive for weakness and numbness. Negative for dizziness and headache.       Objective   Physical Exam   Constitutional: She is oriented to person, place, and time. She appears well-developed and well-nourished.   HENT:   Head: Normocephalic and atraumatic.   Eyes: Pupils are equal, round, and reactive to light. EOM are normal.   Neck: Normal range of motion.   Cardiovascular: Normal rate, regular rhythm, normal heart sounds  and intact distal pulses.   Pulmonary/Chest: Breath sounds normal.   Abdominal: Soft. Bowel sounds are normal. She exhibits no distension. There is no tenderness.   Neurological: She is alert and oriented to person, place, and time. She has normal strength and normal reflexes. She displays normal reflexes. A sensory deficit is present.   Decreased in stocking distribution up to mid-calf b/l   Psychiatric: She has a normal mood and affect. Her behavior is normal. Thought content normal.         Assessment/Plan   Angella was seen today for pain.    Diagnoses and all orders for this visit:    Leg pain, bilateral    Pain in both feet    Peripheral polyneuropathy    Bladder pain    Greater trochanteric bursitis of both hips    Sacroiliac dysfunction    Other long term (current) drug therapy        INspect and Martin reviewed, in order. Low risk per SOAPP. Repeat UDS 11/26/20 in order.  Stop Norco 10/325mg QID prn, rotated to Hysingla 40mg qdaily, worked but pharmacy won't carry, rotated to generic Hydrocodone ER 20mg BID, not strong enough, increase to 30mg BID. Cannot tolerate Morphine, Codeine, Oxycodone.  Cont Gabapentin 1200mg TID, cannot tolerate Lyrica, written by PCP.  Cont RxAlt #2 cream, helps.  Cont Cymbalta.  Began Phenergan 25mg TID prn.  [facility] records unavailable.  RTC 3 months for f/u. Will print 3rd script in 2 months without an appt. Stable.

## 2020-10-08 ENCOUNTER — TELEPHONE (OUTPATIENT)
Dept: PAIN MEDICINE | Facility: HOSPITAL | Age: 65
End: 2020-10-08

## 2020-10-08 RX ORDER — HYDROCODONE BITARTRATE AND ACETAMINOPHEN 10; 325 MG/1; MG/1
1 TABLET ORAL EVERY 6 HOURS PRN
Qty: 120 TABLET | Refills: 0 | Status: SHIPPED | OUTPATIENT
Start: 2020-10-08 | End: 2020-11-05 | Stop reason: SDUPTHER

## 2020-10-08 NOTE — TELEPHONE ENCOUNTER
She can not find a pharmacy that currently has Hydrocodone ER in stock or that can give her a date when they will. She wants to know if you can switch her back to Hydrocodone ? Inspect in folder.

## 2020-11-05 ENCOUNTER — OFFICE VISIT (OUTPATIENT)
Dept: PAIN MEDICINE | Facility: CLINIC | Age: 65
End: 2020-11-05

## 2020-11-05 VITALS
HEART RATE: 98 BPM | DIASTOLIC BLOOD PRESSURE: 97 MMHG | BODY MASS INDEX: 25.03 KG/M2 | SYSTOLIC BLOOD PRESSURE: 139 MMHG | HEIGHT: 69 IN | WEIGHT: 169 LBS | RESPIRATION RATE: 16 BRPM | OXYGEN SATURATION: 99 % | TEMPERATURE: 97.3 F

## 2020-11-05 DIAGNOSIS — M79.605 LEG PAIN, BILATERAL: Primary | ICD-10-CM

## 2020-11-05 DIAGNOSIS — M53.3 SACROILIAC DYSFUNCTION: ICD-10-CM

## 2020-11-05 DIAGNOSIS — M70.61 GREATER TROCHANTERIC BURSITIS OF BOTH HIPS: ICD-10-CM

## 2020-11-05 DIAGNOSIS — M70.62 GREATER TROCHANTERIC BURSITIS OF BOTH HIPS: ICD-10-CM

## 2020-11-05 DIAGNOSIS — M79.604 LEG PAIN, BILATERAL: Primary | ICD-10-CM

## 2020-11-05 DIAGNOSIS — Z79.899 OTHER LONG TERM (CURRENT) DRUG THERAPY: ICD-10-CM

## 2020-11-05 DIAGNOSIS — G62.9 PERIPHERAL POLYNEUROPATHY: ICD-10-CM

## 2020-11-05 DIAGNOSIS — M79.672 PAIN IN BOTH FEET: ICD-10-CM

## 2020-11-05 DIAGNOSIS — R39.89 BLADDER PAIN: ICD-10-CM

## 2020-11-05 DIAGNOSIS — M79.671 PAIN IN BOTH FEET: ICD-10-CM

## 2020-11-05 PROCEDURE — 99214 OFFICE O/P EST MOD 30 MIN: CPT | Performed by: PHYSICAL MEDICINE & REHABILITATION

## 2020-11-05 PROCEDURE — G0463 HOSPITAL OUTPT CLINIC VISIT: HCPCS | Performed by: PHYSICAL MEDICINE & REHABILITATION

## 2020-11-05 RX ORDER — HYDROCODONE BITARTRATE 40 MG/1
1 TABLET, EXTENDED RELEASE ORAL DAILY
Qty: 30 EACH | Refills: 0 | Status: SHIPPED | OUTPATIENT
Start: 2020-11-05 | End: 2021-01-04 | Stop reason: SDUPTHER

## 2020-11-05 RX ORDER — HYDROCODONE BITARTRATE 40 MG/1
1 TABLET, EXTENDED RELEASE ORAL DAILY
Qty: 30 EACH | Refills: 0 | Status: SHIPPED | OUTPATIENT
Start: 2020-11-05 | End: 2020-11-05 | Stop reason: SDUPTHER

## 2020-11-05 NOTE — PROGRESS NOTES
Subjective   Angella Srivastava is a 65 y.o. female.     Bilateral feet pain, also posterior leg pain, LLE > RLE. Dz with peripheral neuropathy in 2005, 10/10 at worst, 5/10 at best, burning, cold, pins & needles, always present, varies, worst in morning and at night, interferes with all activities that involve walking. Had NCS with PN. Started on Darvocet in past, taking Norco 10/325mg about QID prn with benefit at initial visit. Lyrica helped but caused severe cramping, taking Gabapentin 600mg QID at initial visit, also Cymbalta. Referred for pain management. Continued Norco, Gabapentin at 1200mg TID. Norco not lasting 6 hours, started Zohydro 20mg BID, then 30mg BID, can't get anymore, restarted Norco 10mg QID prn. Started CBD oil, helps thumb pain. Worsening nausea. Did better with Hysingla 40mg qdaily.       The following portions of the patient's history were reviewed and updated as appropriate: allergies, current medications, past family history, past medical history, past social history, past surgical history and problem list.    Review of Systems   Constitutional: Negative for chills, fatigue and fever.   HENT: Negative for hearing loss and trouble swallowing.    Eyes: Negative for visual disturbance.   Respiratory: Negative for shortness of breath.    Cardiovascular: Negative for chest pain.   Gastrointestinal: Positive for nausea. Negative for abdominal pain, constipation, diarrhea and vomiting.   Genitourinary: Negative for urinary incontinence.   Musculoskeletal: Positive for arthralgias and back pain. Negative for joint swelling, myalgias and neck pain.   Neurological: Positive for weakness and numbness. Negative for dizziness and headache.       Objective   Physical Exam   Constitutional: She is oriented to person, place, and time. She appears well-developed and well-nourished.   HENT:   Head: Normocephalic and atraumatic.   Eyes: Pupils are equal, round, and reactive to light.   Neck: Normal range of  motion.   Cardiovascular: Normal rate, regular rhythm and normal heart sounds.   Pulmonary/Chest: Breath sounds normal.   Abdominal: Soft. Bowel sounds are normal. She exhibits no distension. There is no abdominal tenderness.   Neurological: She is alert and oriented to person, place, and time. She has normal reflexes. She displays normal reflexes. A sensory deficit is present.   Decreased in stocking distribution up to mid-calf b/l   Psychiatric: Her behavior is normal. Thought content normal.         Assessment/Plan   Diagnoses and all orders for this visit:    1. Leg pain, bilateral (Primary)    2. Pain in both feet    3. Peripheral polyneuropathy    4. Bladder pain    5. Greater trochanteric bursitis of both hips    6. Sacroiliac dysfunction    7. Other long term (current) drug therapy        [facility] and Martin reviewed, in order. Low risk per SOAPP. Repeat UDS 8/6/20 in order.  Stopped Norco 10/325mg QID prn, rotated to Hysingla 40mg qdaily, worked but pharmacy won't carry, rotated to generic Hydrocodone ER 20mg BID, not strong enough, increased to 30mg BID. Restart Hysingla 40mg qdaily. Cannot tolerate Morphine, Codeine, Oxycodone.  Cont Gabapentin 1200mg TID, cannot tolerate Lyrica, written by PCP.  Cont RxAlt #2 cream, helps.  Cont Cymbalta.  Began Phenergan 25mg TID prn.  [facility] records unavailable.  RTC 3 months for f/u. Will print 3rd script in 2 months without an appt. Stable.

## 2021-01-04 DIAGNOSIS — M79.605 LEG PAIN, BILATERAL: ICD-10-CM

## 2021-01-04 DIAGNOSIS — M79.604 LEG PAIN, BILATERAL: ICD-10-CM

## 2021-01-04 RX ORDER — HYDROCODONE BITARTRATE 40 MG/1
1 TABLET, EXTENDED RELEASE ORAL DAILY
Qty: 30 EACH | Refills: 0 | Status: SHIPPED | OUTPATIENT
Start: 2021-01-04 | End: 2021-02-04 | Stop reason: SDUPTHER

## 2021-02-04 ENCOUNTER — APPOINTMENT (OUTPATIENT)
Dept: PAIN MEDICINE | Facility: CLINIC | Age: 66
End: 2021-02-04

## 2021-02-04 ENCOUNTER — TELEPHONE (OUTPATIENT)
Dept: PAIN MEDICINE | Facility: CLINIC | Age: 66
End: 2021-02-04

## 2021-02-04 DIAGNOSIS — M79.604 LEG PAIN, BILATERAL: ICD-10-CM

## 2021-02-04 DIAGNOSIS — M79.605 LEG PAIN, BILATERAL: ICD-10-CM

## 2021-02-04 RX ORDER — HYDROCODONE BITARTRATE 40 MG/1
1 TABLET, EXTENDED RELEASE ORAL DAILY
Qty: 30 EACH | Refills: 0 | Status: SHIPPED | OUTPATIENT
Start: 2021-02-04 | End: 2021-02-26 | Stop reason: SDUPTHER

## 2021-02-26 ENCOUNTER — OFFICE VISIT (OUTPATIENT)
Dept: PAIN MEDICINE | Facility: CLINIC | Age: 66
End: 2021-02-26

## 2021-02-26 VITALS
RESPIRATION RATE: 16 BRPM | TEMPERATURE: 97.5 F | HEART RATE: 84 BPM | DIASTOLIC BLOOD PRESSURE: 98 MMHG | OXYGEN SATURATION: 98 % | SYSTOLIC BLOOD PRESSURE: 165 MMHG | WEIGHT: 169 LBS | HEIGHT: 69 IN | BODY MASS INDEX: 25.03 KG/M2

## 2021-02-26 DIAGNOSIS — M79.605 LEG PAIN, BILATERAL: ICD-10-CM

## 2021-02-26 DIAGNOSIS — G62.9 PERIPHERAL POLYNEUROPATHY: ICD-10-CM

## 2021-02-26 DIAGNOSIS — M79.671 PAIN IN BOTH FEET: Primary | ICD-10-CM

## 2021-02-26 DIAGNOSIS — M53.3 SACROILIAC DYSFUNCTION: ICD-10-CM

## 2021-02-26 DIAGNOSIS — M70.62 GREATER TROCHANTERIC BURSITIS OF BOTH HIPS: ICD-10-CM

## 2021-02-26 DIAGNOSIS — Z79.899 OTHER LONG TERM (CURRENT) DRUG THERAPY: ICD-10-CM

## 2021-02-26 DIAGNOSIS — M70.61 GREATER TROCHANTERIC BURSITIS OF BOTH HIPS: ICD-10-CM

## 2021-02-26 DIAGNOSIS — M79.672 PAIN IN BOTH FEET: Primary | ICD-10-CM

## 2021-02-26 DIAGNOSIS — M79.604 LEG PAIN, BILATERAL: ICD-10-CM

## 2021-02-26 DIAGNOSIS — R39.89 BLADDER PAIN: ICD-10-CM

## 2021-02-26 PROCEDURE — 99213 OFFICE O/P EST LOW 20 MIN: CPT | Performed by: PHYSICAL MEDICINE & REHABILITATION

## 2021-02-26 RX ORDER — HYDROCODONE BITARTRATE 40 MG/1
1 TABLET, EXTENDED RELEASE ORAL DAILY
Qty: 30 EACH | Refills: 0 | Status: SHIPPED | OUTPATIENT
Start: 2021-02-26 | End: 2021-05-21 | Stop reason: SDUPTHER

## 2021-02-26 RX ORDER — HYDROCODONE BITARTRATE 40 MG/1
1 TABLET, EXTENDED RELEASE ORAL DAILY
Qty: 30 EACH | Refills: 0 | Status: SHIPPED | OUTPATIENT
Start: 2021-02-26 | End: 2021-05-03 | Stop reason: SDUPTHER

## 2021-02-26 RX ORDER — GABAPENTIN 600 MG/1
1200 TABLET ORAL 3 TIMES DAILY
Qty: 540 TABLET | Refills: 0 | Status: SHIPPED | OUTPATIENT
Start: 2021-02-26 | End: 2022-02-11 | Stop reason: SDUPTHER

## 2021-02-26 RX ORDER — GABAPENTIN 600 MG/1
600 TABLET ORAL 3 TIMES DAILY
Qty: 270 TABLET | Refills: 0 | Status: SHIPPED | OUTPATIENT
Start: 2021-02-26 | End: 2021-02-26 | Stop reason: SDUPTHER

## 2021-02-26 NOTE — PROGRESS NOTES
Subjective   Angella Srivastava is a 65 y.o. female.     Bilateral feet pain, also posterior leg pain, LLE > RLE. Dz with peripheral neuropathy in 2005, 10/10 at worst, 5/10 at best, burning, cold, pins & needles, always present, varies, worst in morning and at night, interferes with all activities that involve walking. Had NCS with PN. Started on Darvocet in past, taking Norco 10/325mg about QID prn with benefit at initial visit. Lyrica helped but caused severe cramping, taking Gabapentin 600mg QID at initial visit, also Cymbalta. Referred for pain management. Continued Norco, Gabapentin at 1200mg TID. Norco not lasting 6 hours, started Zohydro 20mg BID, then 30mg BID, can't get anymore, restarted Norco 10mg QID prn. Started CBD oil, helps thumb pain. Worsening nausea. Did better with Hysingla 40mg qdaily.       The following portions of the patient's history were reviewed and updated as appropriate: allergies, current medications, past family history, past medical history, past social history, past surgical history and problem list.    Review of Systems   Constitutional: Negative for chills, fatigue and fever.   HENT: Negative for hearing loss and trouble swallowing.    Eyes: Negative for visual disturbance.   Respiratory: Negative for shortness of breath.    Cardiovascular: Negative for chest pain.   Gastrointestinal: Positive for nausea. Negative for abdominal pain, constipation, diarrhea and vomiting.   Genitourinary: Negative for urinary incontinence.   Musculoskeletal: Positive for arthralgias and back pain. Negative for joint swelling, myalgias and neck pain.   Neurological: Positive for weakness and numbness. Negative for dizziness and headache.       Objective   Physical Exam   Constitutional: She is oriented to person, place, and time. She appears well-developed and well-nourished.   HENT:   Head: Normocephalic and atraumatic.   Eyes: Pupils are equal, round, and reactive to light.   Neck: Normal range of  motion.   Cardiovascular: Normal rate, regular rhythm and normal heart sounds.   Pulmonary/Chest: Breath sounds normal.   Abdominal: Soft. Bowel sounds are normal. She exhibits no distension. There is no abdominal tenderness.   Neurological: She is alert and oriented to person, place, and time. She has normal reflexes. She displays normal reflexes. A sensory deficit is present.   Decreased in stocking distribution up to mid-calf b/l   Psychiatric: Her behavior is normal. Thought content normal.         Assessment/Plan   Diagnoses and all orders for this visit:    1. Pain in both feet (Primary)    2. Leg pain, bilateral    3. Greater trochanteric bursitis of both hips    4. Bladder pain    5. Other long term (current) drug therapy    6. Peripheral polyneuropathy    7. Sacroiliac dysfunction        [facility] and Martin reviewed, in order. Low risk per SOAPP. Repeat UDS 8/6/20 in order.  Stopped Norco 10/325mg QID prn, rotated to Hysingla 40mg qdaily, worked but pharmacy won't carry, rotated to generic Hydrocodone ER 20mg BID, not strong enough, increased to 30mg BID. Restarted Hysingla 40mg qdaily. Cannot tolerate Morphine, Codeine, Oxycodone.  Cont Gabapentin 1200mg TID, cannot tolerate Lyrica, written by PCP.  Cont RxAlt #2 cream, helps.  Cont Cymbalta.  Began Phenergan 25mg TID prn.  [facility] records unavailable.  RTC 3 months for f/u. Will send 3rd script in 2 months without an appt. Stable.

## 2021-05-03 ENCOUNTER — TELEPHONE (OUTPATIENT)
Dept: PAIN MEDICINE | Facility: HOSPITAL | Age: 66
End: 2021-05-03

## 2021-05-03 DIAGNOSIS — M79.605 LEG PAIN, BILATERAL: ICD-10-CM

## 2021-05-03 DIAGNOSIS — M79.672 PAIN IN BOTH FEET: ICD-10-CM

## 2021-05-03 DIAGNOSIS — M79.671 PAIN IN BOTH FEET: ICD-10-CM

## 2021-05-03 DIAGNOSIS — M79.604 LEG PAIN, BILATERAL: ICD-10-CM

## 2021-05-03 RX ORDER — HYDROCODONE BITARTRATE 40 MG/1
1 TABLET, EXTENDED RELEASE ORAL DAILY
Qty: 30 EACH | Refills: 0 | Status: SHIPPED | OUTPATIENT
Start: 2021-05-03 | End: 2021-05-21 | Stop reason: SDUPTHER

## 2021-05-05 ENCOUNTER — TELEPHONE (OUTPATIENT)
Dept: PAIN MEDICINE | Facility: CLINIC | Age: 66
End: 2021-05-05

## 2021-05-05 NOTE — TELEPHONE ENCOUNTER
Caller: LAINEY    Relationship: PHARMACIST- [facility]    Best call back number: [phone]    Medication needed: HYSINGLA- PAIN MEDICINE- REQUIRES PRIOR AUTH  Requested Prescriptions      No prescriptions requested or ordered in this encounter       When do you need the refill by: 05/05/2021    What additional details did the patient provide when requesting the medication: NEW RX    Does the patient have less than a 3 day supply:  [x] Yes  [] No    What is the patient's preferred pharmacy: [facility]-[id number]-[phone]       HUB ATTEMPTED WARM TRANSFER

## 2021-05-07 ENCOUNTER — TELEPHONE (OUTPATIENT)
Dept: PAIN MEDICINE | Facility: CLINIC | Age: 66
End: 2021-05-07

## 2021-05-21 ENCOUNTER — OFFICE VISIT (OUTPATIENT)
Dept: PAIN MEDICINE | Facility: CLINIC | Age: 66
End: 2021-05-21

## 2021-05-21 VITALS
DIASTOLIC BLOOD PRESSURE: 84 MMHG | OXYGEN SATURATION: 98 % | BODY MASS INDEX: 25.03 KG/M2 | HEIGHT: 69 IN | WEIGHT: 169 LBS | HEART RATE: 88 BPM | SYSTOLIC BLOOD PRESSURE: 125 MMHG | RESPIRATION RATE: 16 BRPM

## 2021-05-21 DIAGNOSIS — G62.9 PERIPHERAL POLYNEUROPATHY: ICD-10-CM

## 2021-05-21 DIAGNOSIS — R39.89 BLADDER PAIN: ICD-10-CM

## 2021-05-21 DIAGNOSIS — M53.3 SACROILIAC DYSFUNCTION: ICD-10-CM

## 2021-05-21 DIAGNOSIS — M79.671 PAIN IN BOTH FEET: ICD-10-CM

## 2021-05-21 DIAGNOSIS — M70.61 GREATER TROCHANTERIC BURSITIS OF BOTH HIPS: ICD-10-CM

## 2021-05-21 DIAGNOSIS — M79.604 LEG PAIN, BILATERAL: Primary | ICD-10-CM

## 2021-05-21 DIAGNOSIS — M79.605 LEG PAIN, BILATERAL: Primary | ICD-10-CM

## 2021-05-21 DIAGNOSIS — M70.62 GREATER TROCHANTERIC BURSITIS OF BOTH HIPS: ICD-10-CM

## 2021-05-21 DIAGNOSIS — M79.672 PAIN IN BOTH FEET: ICD-10-CM

## 2021-05-21 DIAGNOSIS — Z79.899 OTHER LONG TERM (CURRENT) DRUG THERAPY: ICD-10-CM

## 2021-05-21 PROCEDURE — 99213 OFFICE O/P EST LOW 20 MIN: CPT | Performed by: PHYSICAL MEDICINE & REHABILITATION

## 2021-05-21 RX ORDER — HYDROCODONE BITARTRATE 40 MG/1
1 TABLET, EXTENDED RELEASE ORAL DAILY
Qty: 30 EACH | Refills: 0 | Status: SHIPPED | OUTPATIENT
Start: 2021-05-21 | End: 2021-08-27 | Stop reason: SDUPTHER

## 2021-05-21 RX ORDER — HYDROCODONE BITARTRATE 40 MG/1
1 TABLET, EXTENDED RELEASE ORAL DAILY
Qty: 30 EACH | Refills: 0 | Status: SHIPPED | OUTPATIENT
Start: 2021-05-21 | End: 2021-08-03 | Stop reason: SDUPTHER

## 2021-05-21 NOTE — PROGRESS NOTES
Subjective   Angella Srivastava is a 66 y.o. female.     Bilateral feet pain, also posterior leg pain, LLE > RLE. Dz with peripheral neuropathy in 2005, 10/10 at worst, 5/10 at best, burning, cold, pins & needles, always present, varies, worst in morning and at night, interferes with all activities that involve walking. Had NCS with PN. Started on Darvocet in past, taking Norco 10/325mg about QID prn with benefit at initial visit. Lyrica helped but caused severe cramping, taking Gabapentin 600mg QID at initial visit, also Cymbalta. Referred for pain management. Continued Norco, Gabapentin at 1200mg TID. Norco not lasting 6 hours, started Zohydro 20mg BID, then 30mg BID, can't get anymore, restarted Norco 10mg QID prn. Started CBD oil, helps thumb pain. Worsening nausea. Did better with Hysingla 40mg qdaily.       The following portions of the patient's history were reviewed and updated as appropriate: allergies, current medications, past family history, past medical history, past social history, past surgical history and problem list.    Review of Systems   Constitutional: Negative for chills, fatigue and fever.   HENT: Negative for hearing loss and trouble swallowing.    Eyes: Negative for visual disturbance.   Respiratory: Negative for shortness of breath.    Cardiovascular: Negative for chest pain.   Gastrointestinal: Positive for nausea. Negative for abdominal pain, constipation, diarrhea and vomiting.   Genitourinary: Negative for urinary incontinence.   Musculoskeletal: Positive for arthralgias and back pain. Negative for joint swelling, myalgias and neck pain.   Neurological: Positive for weakness and numbness. Negative for dizziness and headache.       Objective   Physical Exam   Constitutional: She is oriented to person, place, and time. She appears well-developed and well-nourished.   HENT:   Head: Normocephalic and atraumatic.   Eyes: Pupils are equal, round, and reactive to light.   Cardiovascular: Normal  rate, regular rhythm and normal heart sounds.   Pulmonary/Chest: Breath sounds normal.   Abdominal: Soft. Bowel sounds are normal. She exhibits no distension. There is no abdominal tenderness.   Neurological: She is alert and oriented to person, place, and time. She has normal reflexes. She displays normal reflexes. A sensory deficit is present.   Decreased in stocking distribution up to mid-calf b/l   Psychiatric: Her behavior is normal. Thought content normal.         Assessment/Plan   Diagnoses and all orders for this visit:    1. Leg pain, bilateral (Primary)    2. Greater trochanteric bursitis of both hips    3. Bladder pain    4. Other long term (current) drug therapy    5. Pain in both feet    6. Peripheral polyneuropathy    7. Sacroiliac dysfunction        [facility]spect and Martin reviewed, in order. Low risk per SOAPP. Repeat UDS 8/6/20 in order.  Stopped Norco 10/325mg QID prn, rotated to Hysingla 40mg qdaily, worked but pharmacy won't carry, rotated to generic Hydrocodone ER 20mg BID, not strong enough, increased to 30mg BID. Restarted Hysingla 40mg qdaily. Cannot tolerate Morphine, Codeine, Oxycodone.  Cont Gabapentin 1200mg TID, cannot tolerate Lyrica, written by PCP.  Cont RxAlt #2 cream, helps.  Cont Cymbalta.  Began Phenergan 25mg TID prn.  [facility] records unavailable.  RTC 3 months for f/u. Will send 3rd script in 2 months without an appt. Stable.

## 2021-08-02 ENCOUNTER — TELEPHONE (OUTPATIENT)
Dept: PAIN MEDICINE | Facility: CLINIC | Age: 66
End: 2021-08-02

## 2021-08-02 DIAGNOSIS — M79.605 LEG PAIN, BILATERAL: ICD-10-CM

## 2021-08-02 DIAGNOSIS — M79.604 LEG PAIN, BILATERAL: ICD-10-CM

## 2021-08-02 NOTE — TELEPHONE ENCOUNTER
Caller: YECENIA WU    Relationship: SELF     Best call back number: [phone]    Medication needed:   HYDROcodone Bitartrate ER (Hysingla ER) 40 MG tablet extended-release 24 hour     When do you need the refill by:   Thursday 08/05    What additional details did the patient provide when requesting the medication: PATIENT HAS  3 PILLS LEFT     Does the patient have less than a 3 day supply:  [] Yes  [x] No    What is the patient's preferred pharmacy:    [facility]

## 2021-08-03 RX ORDER — HYDROCODONE BITARTRATE 40 MG/1
1 TABLET, EXTENDED RELEASE ORAL DAILY
Qty: 30 EACH | Refills: 0 | Status: SHIPPED | OUTPATIENT
Start: 2021-08-03 | End: 2021-08-27 | Stop reason: SDUPTHER

## 2021-08-27 ENCOUNTER — OFFICE VISIT (OUTPATIENT)
Dept: PAIN MEDICINE | Facility: CLINIC | Age: 66
End: 2021-08-27

## 2021-08-27 VITALS
HEART RATE: 75 BPM | OXYGEN SATURATION: 99 % | HEIGHT: 69 IN | RESPIRATION RATE: 16 BRPM | WEIGHT: 169 LBS | BODY MASS INDEX: 25.03 KG/M2 | DIASTOLIC BLOOD PRESSURE: 72 MMHG | SYSTOLIC BLOOD PRESSURE: 109 MMHG

## 2021-08-27 DIAGNOSIS — Z79.899 OTHER LONG TERM (CURRENT) DRUG THERAPY: ICD-10-CM

## 2021-08-27 DIAGNOSIS — M79.604 LEG PAIN, BILATERAL: Primary | ICD-10-CM

## 2021-08-27 DIAGNOSIS — G62.9 PERIPHERAL POLYNEUROPATHY: ICD-10-CM

## 2021-08-27 DIAGNOSIS — Z79.899 HIGH RISK MEDICATION USE: Primary | ICD-10-CM

## 2021-08-27 DIAGNOSIS — M53.3 SACROILIAC DYSFUNCTION: ICD-10-CM

## 2021-08-27 DIAGNOSIS — M79.671 PAIN IN BOTH FEET: ICD-10-CM

## 2021-08-27 DIAGNOSIS — M70.62 GREATER TROCHANTERIC BURSITIS OF BOTH HIPS: ICD-10-CM

## 2021-08-27 DIAGNOSIS — M79.672 PAIN IN BOTH FEET: ICD-10-CM

## 2021-08-27 DIAGNOSIS — M70.61 GREATER TROCHANTERIC BURSITIS OF BOTH HIPS: ICD-10-CM

## 2021-08-27 DIAGNOSIS — R39.89 BLADDER PAIN: ICD-10-CM

## 2021-08-27 DIAGNOSIS — M79.605 LEG PAIN, BILATERAL: Primary | ICD-10-CM

## 2021-08-27 PROCEDURE — 99213 OFFICE O/P EST LOW 20 MIN: CPT | Performed by: PHYSICAL MEDICINE & REHABILITATION

## 2021-08-27 RX ORDER — HYDROCODONE BITARTRATE 40 MG/1
1 TABLET, EXTENDED RELEASE ORAL DAILY
Qty: 30 EACH | Refills: 0 | Status: SHIPPED | OUTPATIENT
Start: 2021-08-27 | End: 2021-11-19 | Stop reason: SDUPTHER

## 2021-08-27 RX ORDER — MECLIZINE HYDROCHLORIDE 25 MG/1
25 TABLET ORAL 2 TIMES DAILY PRN
COMMUNITY
Start: 2021-06-24 | End: 2021-11-19

## 2021-08-27 RX ORDER — HYDROCODONE BITARTRATE 40 MG/1
1 TABLET, EXTENDED RELEASE ORAL DAILY
Qty: 30 EACH | Refills: 0 | Status: SHIPPED | OUTPATIENT
Start: 2021-08-27 | End: 2021-11-01 | Stop reason: SDUPTHER

## 2021-11-01 ENCOUNTER — TELEPHONE (OUTPATIENT)
Dept: PAIN MEDICINE | Facility: CLINIC | Age: 66
End: 2021-11-01

## 2021-11-01 DIAGNOSIS — M79.605 LEG PAIN, BILATERAL: ICD-10-CM

## 2021-11-01 DIAGNOSIS — M79.604 LEG PAIN, BILATERAL: ICD-10-CM

## 2021-11-01 RX ORDER — HYDROCODONE BITARTRATE 40 MG/1
1 TABLET, EXTENDED RELEASE ORAL DAILY
Qty: 30 EACH | Refills: 0 | Status: SHIPPED | OUTPATIENT
Start: 2021-11-01 | End: 2021-11-19 | Stop reason: SDUPTHER

## 2021-11-19 ENCOUNTER — OFFICE VISIT (OUTPATIENT)
Dept: PAIN MEDICINE | Facility: CLINIC | Age: 66
End: 2021-11-19

## 2021-11-19 VITALS
SYSTOLIC BLOOD PRESSURE: 111 MMHG | OXYGEN SATURATION: 97 % | BODY MASS INDEX: 25.03 KG/M2 | HEART RATE: 81 BPM | HEIGHT: 69 IN | DIASTOLIC BLOOD PRESSURE: 70 MMHG | WEIGHT: 169 LBS | RESPIRATION RATE: 16 BRPM

## 2021-11-19 DIAGNOSIS — R39.89 BLADDER PAIN: ICD-10-CM

## 2021-11-19 DIAGNOSIS — M79.672 PAIN IN BOTH FEET: ICD-10-CM

## 2021-11-19 DIAGNOSIS — G62.9 PERIPHERAL POLYNEUROPATHY: ICD-10-CM

## 2021-11-19 DIAGNOSIS — M79.604 LEG PAIN, BILATERAL: Primary | ICD-10-CM

## 2021-11-19 DIAGNOSIS — M79.605 LEG PAIN, BILATERAL: Primary | ICD-10-CM

## 2021-11-19 DIAGNOSIS — M79.671 PAIN IN BOTH FEET: ICD-10-CM

## 2021-11-19 DIAGNOSIS — M70.62 GREATER TROCHANTERIC BURSITIS OF BOTH HIPS: ICD-10-CM

## 2021-11-19 DIAGNOSIS — M70.61 GREATER TROCHANTERIC BURSITIS OF BOTH HIPS: ICD-10-CM

## 2021-11-19 DIAGNOSIS — Z79.899 OTHER LONG TERM (CURRENT) DRUG THERAPY: ICD-10-CM

## 2021-11-19 DIAGNOSIS — M53.3 SACROILIAC DYSFUNCTION: ICD-10-CM

## 2021-11-19 PROCEDURE — 99213 OFFICE O/P EST LOW 20 MIN: CPT | Performed by: PHYSICAL MEDICINE & REHABILITATION

## 2021-11-19 RX ORDER — HYDROCODONE BITARTRATE 40 MG/1
1 TABLET, EXTENDED RELEASE ORAL DAILY
Qty: 30 EACH | Refills: 0 | Status: SHIPPED | OUTPATIENT
Start: 2021-11-19 | End: 2022-01-26 | Stop reason: SDUPTHER

## 2021-11-19 RX ORDER — HYDROCODONE BITARTRATE 40 MG/1
1 TABLET, EXTENDED RELEASE ORAL DAILY
Qty: 30 EACH | Refills: 0 | Status: SHIPPED | OUTPATIENT
Start: 2021-11-19 | End: 2022-02-11 | Stop reason: SDUPTHER

## 2021-11-19 NOTE — PROGRESS NOTES
Subjective   Angella Srivastava is a 66 y.o. female.     Bilateral feet pain, also posterior leg pain, LLE > RLE. Dz with peripheral neuropathy in 2005, 10/10 at worst, 5/10 at best, burning, cold, pins & needles, always present, varies, worst in morning and at night, interferes with all activities that involve walking. Had NCS with PN. Started on Darvocet in past, taking Norco 10/325mg about QID prn with benefit at initial visit. Lyrica helped but caused severe cramping, taking Gabapentin 600mg QID at initial visit, also Cymbalta. Referred for pain management. Continued Norco, Gabapentin at 1200mg TID. Norco not lasting 6 hours, started Zohydro 20mg BID, then 30mg BID, can't get anymore, restarted Norco 10mg QID prn. Started CBD oil, helps thumb pain. Worsening nausea. Did better with Hysingla 40mg qdaily.       The following portions of the patient's history were reviewed and updated as appropriate: allergies, current medications, past family history, past medical history, past social history, past surgical history and problem list.    Review of Systems   Constitutional: Negative for chills, fatigue and fever.   HENT: Negative for hearing loss and trouble swallowing.    Eyes: Negative for visual disturbance.   Respiratory: Negative for shortness of breath.    Cardiovascular: Negative for chest pain.   Gastrointestinal: Positive for nausea. Negative for abdominal pain, constipation, diarrhea and vomiting.   Genitourinary: Negative for urinary incontinence.   Musculoskeletal: Positive for arthralgias and back pain. Negative for joint swelling, myalgias and neck pain.   Neurological: Positive for weakness and numbness. Negative for dizziness and headache.       Objective   Physical Exam   Constitutional: She is oriented to person, place, and time. She appears well-developed and well-nourished.   HENT:   Head: Normocephalic and atraumatic.   Eyes: Pupils are equal, round, and reactive to light.   Cardiovascular: Normal  rate, regular rhythm and normal heart sounds.   Pulmonary/Chest: Breath sounds normal.   Abdominal: Soft. Bowel sounds are normal. She exhibits no distension. There is no abdominal tenderness.   Neurological: She is alert and oriented to person, place, and time. She has normal reflexes. She displays normal reflexes. A sensory deficit is present.   Decreased in stocking distribution up to mid-calf b/l   Psychiatric: Her behavior is normal. Thought content normal.         Assessment/Plan   Diagnoses and all orders for this visit:    1. Leg pain, bilateral (Primary)    2. Pain in both feet    3. Other long term (current) drug therapy    4. Greater trochanteric bursitis of both hips    5. Bladder pain    6. Peripheral polyneuropathy    7. Sacroiliac dysfunction        INspect and Martin reviewed, in order. Low risk per SOAPP. Repeat UDS 8/27/21 in order.  Stopped Norco 10/325mg QID prn, rotated to Hysingla 40mg qdaily, worked but pharmacy won't carry, rotated to generic Hydrocodone ER 20mg BID, not strong enough, increased to 30mg BID. Restarted Hysingla 40mg qdaily. Cannot tolerate Morphine, Codeine, Oxycodone. May need to restart Norco 10mg QID prn if insurance changes.  Cont Gabapentin 1200mg TID, cannot tolerate Lyrica, written by PCP.  Cont RxAlt #2 cream, helps.  Cont Cymbalta.  Began Phenergan 25mg TID prn.  [facility] records unavailable.  RTC 3 months for f/u. Will send 3rd script in 2 months without an appt. Stable.

## 2022-01-26 ENCOUNTER — TELEPHONE (OUTPATIENT)
Dept: PAIN MEDICINE | Facility: CLINIC | Age: 67
End: 2022-01-26

## 2022-01-26 DIAGNOSIS — M79.605 LEG PAIN, BILATERAL: ICD-10-CM

## 2022-01-26 DIAGNOSIS — M79.604 LEG PAIN, BILATERAL: ICD-10-CM

## 2022-01-26 RX ORDER — HYDROCODONE BITARTRATE 40 MG/1
1 TABLET, EXTENDED RELEASE ORAL DAILY
Qty: 30 EACH | Refills: 0 | Status: SHIPPED | OUTPATIENT
Start: 2022-01-26 | End: 2022-02-11 | Stop reason: SDUPTHER

## 2022-01-26 NOTE — TELEPHONE ENCOUNTER
1/26/22--- Dr Manning-- pt called left -- needs  Hysingla sent to her Pharmacy erly so  They can order it for  End of January fill-- please escribe now so pharmacy can order it and she will have it by due  Date-- having trouble at pharmacy

## 2022-02-11 ENCOUNTER — OFFICE VISIT (OUTPATIENT)
Dept: PAIN MEDICINE | Facility: CLINIC | Age: 67
End: 2022-02-11

## 2022-02-11 VITALS
OXYGEN SATURATION: 97 % | HEIGHT: 69 IN | BODY MASS INDEX: 25.03 KG/M2 | HEART RATE: 72 BPM | WEIGHT: 169 LBS | RESPIRATION RATE: 16 BRPM | DIASTOLIC BLOOD PRESSURE: 72 MMHG | SYSTOLIC BLOOD PRESSURE: 128 MMHG

## 2022-02-11 DIAGNOSIS — M79.605 LEG PAIN, BILATERAL: Primary | ICD-10-CM

## 2022-02-11 DIAGNOSIS — M79.604 LEG PAIN, BILATERAL: Primary | ICD-10-CM

## 2022-02-11 DIAGNOSIS — Z79.899 OTHER LONG TERM (CURRENT) DRUG THERAPY: ICD-10-CM

## 2022-02-11 DIAGNOSIS — G62.9 PERIPHERAL POLYNEUROPATHY: ICD-10-CM

## 2022-02-11 DIAGNOSIS — M70.62 GREATER TROCHANTERIC BURSITIS OF BOTH HIPS: ICD-10-CM

## 2022-02-11 DIAGNOSIS — M79.671 PAIN IN BOTH FEET: ICD-10-CM

## 2022-02-11 DIAGNOSIS — M53.3 SACROILIAC DYSFUNCTION: ICD-10-CM

## 2022-02-11 DIAGNOSIS — M79.672 PAIN IN BOTH FEET: ICD-10-CM

## 2022-02-11 DIAGNOSIS — M70.61 GREATER TROCHANTERIC BURSITIS OF BOTH HIPS: ICD-10-CM

## 2022-02-11 DIAGNOSIS — R39.89 BLADDER PAIN: ICD-10-CM

## 2022-02-11 PROCEDURE — 99213 OFFICE O/P EST LOW 20 MIN: CPT | Performed by: PHYSICAL MEDICINE & REHABILITATION

## 2022-02-11 RX ORDER — HYDROCODONE BITARTRATE 40 MG/1
1 TABLET, EXTENDED RELEASE ORAL DAILY
Qty: 30 EACH | Refills: 0 | Status: SHIPPED | OUTPATIENT
Start: 2022-02-11 | End: 2022-02-22 | Stop reason: SDUPTHER

## 2022-02-11 RX ORDER — GABAPENTIN 600 MG/1
1200 TABLET ORAL 3 TIMES DAILY
Qty: 540 TABLET | Refills: 1 | Status: SHIPPED | OUTPATIENT
Start: 2022-02-11 | End: 2022-05-06 | Stop reason: SDUPTHER

## 2022-02-11 NOTE — PROGRESS NOTES
Subjective   Angella Srivastava is a 66 y.o. female.     Bilateral feet pain, also posterior leg pain, LLE > RLE. Dz with peripheral neuropathy in 2005, 10/10 at worst, 5/10 at best, burning, cold, pins & needles, always present, varies, worst in morning and at night, interferes with all activities that involve walking. Had NCS with PN. Started on Darvocet in past, taking Norco 10/325mg about QID prn with benefit at initial visit. Lyrica helped but caused severe cramping, taking Gabapentin 600mg QID at initial visit, also Cymbalta. Referred for pain management. Continued Norco, Gabapentin at 1200mg TID. Norco not lasting 6 hours, started Zohydro 20mg BID, then 30mg BID, can't get anymore, restarted Norco 10mg QID prn. Started CBD oil, helps thumb pain. Worsening nausea. Did better with Hysingla 40mg qdaily.       The following portions of the patient's history were reviewed and updated as appropriate: allergies, current medications, past family history, past medical history, past social history, past surgical history and problem list.    Review of Systems   Constitutional: Negative for chills, fatigue and fever.   HENT: Negative for hearing loss and trouble swallowing.    Eyes: Negative for visual disturbance.   Respiratory: Negative for shortness of breath.    Cardiovascular: Negative for chest pain.   Gastrointestinal: Positive for nausea. Negative for abdominal pain, constipation, diarrhea and vomiting.   Genitourinary: Negative for urinary incontinence.   Musculoskeletal: Positive for arthralgias and back pain. Negative for joint swelling, myalgias and neck pain.   Neurological: Positive for weakness and numbness. Negative for dizziness and headache.       Objective   Physical Exam   Constitutional: She is oriented to person, place, and time. She appears well-developed and well-nourished.   HENT:   Head: Normocephalic and atraumatic.   Eyes: Pupils are equal, round, and reactive to light.   Cardiovascular: Normal  rate, regular rhythm and normal heart sounds.   Pulmonary/Chest: Breath sounds normal.   Abdominal: Soft. Bowel sounds are normal. She exhibits no distension. There is no abdominal tenderness.   Neurological: She is alert and oriented to person, place, and time. She has normal reflexes. She displays normal reflexes. A sensory deficit is present.   Decreased in stocking distribution up to mid-calf b/l   Psychiatric: Her behavior is normal. Thought content normal.         Assessment/Plan   Diagnoses and all orders for this visit:    1. Leg pain, bilateral (Primary)    2. Greater trochanteric bursitis of both hips    3. Bladder pain    4. Other long term (current) drug therapy    5. Pain in both feet    6. Peripheral polyneuropathy    7. Sacroiliac dysfunction        INspect and Martin reviewed, in order. Low risk per SOAPP. Repeat UDS 8/27/21 in order.  Stopped Norco 10/325mg QID prn, rotated to Hysingla 40mg qdaily, worked but pharmacy won't carry, rotated to generic Hydrocodone ER 20mg BID, not strong enough, increased to 30mg BID. Restarted Hysingla 40mg qdaily. Cannot tolerate Morphine, Codeine, Oxycodone. May need to restart Norco 10mg QID prn if insurance changes.  Cont Gabapentin 1200mg TID, cannot tolerate Lyrica, written by PCP.  Cont RxAlt #2 cream, helps.  Cont Cymbalta.  Began Phenergan 25mg TID prn.  [facility] records unavailable.  RTC 3 months for f/u. Will send 3rd script in 2 months without an appt. Stable.

## 2022-02-22 DIAGNOSIS — M79.605 LEG PAIN, BILATERAL: ICD-10-CM

## 2022-02-22 DIAGNOSIS — M79.671 PAIN IN BOTH FEET: ICD-10-CM

## 2022-02-22 DIAGNOSIS — M79.672 PAIN IN BOTH FEET: ICD-10-CM

## 2022-02-22 DIAGNOSIS — M79.604 LEG PAIN, BILATERAL: ICD-10-CM

## 2022-02-22 NOTE — TELEPHONE ENCOUNTER
[facility] is no longer going to carry her Hysingla. Can you send it to [facility] in [place]? [facility] is going to cancel any prescriptions they have on file for the Hysingla.

## 2022-02-23 RX ORDER — HYDROCODONE BITARTRATE 40 MG/1
1 TABLET, EXTENDED RELEASE ORAL DAILY
Qty: 30 EACH | Refills: 0 | Status: SHIPPED | OUTPATIENT
Start: 2022-02-23 | End: 2022-04-22 | Stop reason: SDUPTHER

## 2022-02-23 RX ORDER — HYDROCODONE BITARTRATE 40 MG/1
1 TABLET, EXTENDED RELEASE ORAL DAILY
Qty: 30 EACH | Refills: 0 | Status: SHIPPED | OUTPATIENT
Start: 2022-02-23 | End: 2022-05-06 | Stop reason: SDUPTHER

## 2022-04-22 ENCOUNTER — TELEPHONE (OUTPATIENT)
Dept: PAIN MEDICINE | Facility: CLINIC | Age: 67
End: 2022-04-22

## 2022-04-22 DIAGNOSIS — M79.604 LEG PAIN, BILATERAL: ICD-10-CM

## 2022-04-22 DIAGNOSIS — M79.605 LEG PAIN, BILATERAL: ICD-10-CM

## 2022-04-22 RX ORDER — HYDROCODONE BITARTRATE 40 MG/1
1 TABLET, EXTENDED RELEASE ORAL DAILY
Qty: 30 EACH | Refills: 0 | Status: SHIPPED | OUTPATIENT
Start: 2022-04-22 | End: 2022-05-06 | Stop reason: SDUPTHER

## 2022-05-06 ENCOUNTER — OFFICE VISIT (OUTPATIENT)
Dept: PAIN MEDICINE | Facility: CLINIC | Age: 67
End: 2022-05-06

## 2022-05-06 VITALS
OXYGEN SATURATION: 97 % | DIASTOLIC BLOOD PRESSURE: 66 MMHG | HEART RATE: 78 BPM | RESPIRATION RATE: 16 BRPM | SYSTOLIC BLOOD PRESSURE: 131 MMHG | WEIGHT: 169 LBS | BODY MASS INDEX: 24.95 KG/M2

## 2022-05-06 DIAGNOSIS — M79.604 LEG PAIN, BILATERAL: Primary | ICD-10-CM

## 2022-05-06 DIAGNOSIS — M79.605 LEG PAIN, BILATERAL: Primary | ICD-10-CM

## 2022-05-06 DIAGNOSIS — Z79.899 OTHER LONG TERM (CURRENT) DRUG THERAPY: ICD-10-CM

## 2022-05-06 DIAGNOSIS — M79.671 PAIN IN BOTH FEET: ICD-10-CM

## 2022-05-06 DIAGNOSIS — M70.61 GREATER TROCHANTERIC BURSITIS OF BOTH HIPS: ICD-10-CM

## 2022-05-06 DIAGNOSIS — M70.62 GREATER TROCHANTERIC BURSITIS OF BOTH HIPS: ICD-10-CM

## 2022-05-06 DIAGNOSIS — R39.89 BLADDER PAIN: ICD-10-CM

## 2022-05-06 DIAGNOSIS — M79.672 PAIN IN BOTH FEET: ICD-10-CM

## 2022-05-06 DIAGNOSIS — M53.3 SACROILIAC DYSFUNCTION: ICD-10-CM

## 2022-05-06 DIAGNOSIS — G62.9 PERIPHERAL POLYNEUROPATHY: ICD-10-CM

## 2022-05-06 PROCEDURE — 99213 OFFICE O/P EST LOW 20 MIN: CPT | Performed by: PHYSICAL MEDICINE & REHABILITATION

## 2022-05-06 RX ORDER — GABAPENTIN 600 MG/1
1200 TABLET ORAL 3 TIMES DAILY
Qty: 540 TABLET | Refills: 1 | Status: SHIPPED | OUTPATIENT
Start: 2022-05-06 | End: 2022-05-10 | Stop reason: SDUPTHER

## 2022-05-06 RX ORDER — HYDROCODONE BITARTRATE 40 MG/1
1 TABLET, EXTENDED RELEASE ORAL DAILY
Qty: 30 EACH | Refills: 0 | Status: SHIPPED | OUTPATIENT
Start: 2022-05-06 | End: 2022-07-25 | Stop reason: SDUPTHER

## 2022-05-06 RX ORDER — HYDROCODONE BITARTRATE 40 MG/1
1 TABLET, EXTENDED RELEASE ORAL DAILY
Qty: 30 EACH | Refills: 0 | Status: SHIPPED | OUTPATIENT
Start: 2022-05-06 | End: 2022-07-19 | Stop reason: SDUPTHER

## 2022-05-06 NOTE — PROGRESS NOTES
Subjective   Angella Srivastava is a 67 y.o. female.     Bilateral feet pain, also posterior leg pain, LLE > RLE. Dz with peripheral neuropathy in 2005, 10/10 at worst, 5/10 at best, burning, cold, pins & needles, always present, varies, worst in morning and at night, interferes with all activities that involve walking. Had NCS with PN. Started on Darvocet in past, taking Norco 10/325mg about QID prn with benefit at initial visit. Lyrica helped but caused severe cramping, taking Gabapentin 600mg QID at initial visit, also Cymbalta. Referred for pain management. Continued Norco, Gabapentin at 1200mg TID. Norco not lasting 6 hours, started Zohydro 20mg BID, then 30mg BID, can't get anymore, restarted Norco 10mg QID prn. Started CBD oil, helps thumb pain. Worsening nausea. Did better with Hysingla 40mg qdaily.       The following portions of the patient's history were reviewed and updated as appropriate: allergies, current medications, past family history, past medical history, past social history, past surgical history and problem list.    Review of Systems   Constitutional: Negative for chills, fatigue and fever.   HENT: Negative for hearing loss and trouble swallowing.    Eyes: Negative for visual disturbance.   Respiratory: Negative for shortness of breath.    Cardiovascular: Negative for chest pain.   Gastrointestinal: Positive for nausea. Negative for abdominal pain, constipation, diarrhea and vomiting.   Genitourinary: Negative for urinary incontinence.   Musculoskeletal: Positive for arthralgias and back pain. Negative for joint swelling, myalgias and neck pain.   Neurological: Positive for weakness and numbness. Negative for dizziness and headache.       Objective   Physical Exam   Constitutional: She is oriented to person, place, and time. She appears well-developed and well-nourished.   HENT:   Head: Normocephalic and atraumatic.   Eyes: Pupils are equal, round, and reactive to light.   Cardiovascular: Normal  rate, regular rhythm and normal heart sounds.   Pulmonary/Chest: Breath sounds normal.   Abdominal: Soft. Bowel sounds are normal. She exhibits no distension. There is no abdominal tenderness.   Neurological: She is alert and oriented to person, place, and time. She has normal reflexes. She displays normal reflexes. A sensory deficit is present.   Decreased in stocking distribution up to mid-calf b/l   Psychiatric: Her behavior is normal. Thought content normal.         Assessment/Plan   Diagnoses and all orders for this visit:    1. Leg pain, bilateral (Primary)    2. Pain in both feet    3. Bladder pain    4. Greater trochanteric bursitis of both hips    5. Other long term (current) drug therapy    6. Peripheral polyneuropathy    7. Sacroiliac dysfunction        INspect and Martin reviewed, in order. Low risk per SOAPP. Repeat UDS 8/27/21 in order.  Stopped Norco 10/325mg QID prn, rotated to Hysingla 40mg qdaily, worked but pharmacy won't carry, rotated to generic Hydrocodone ER 20mg BID, not strong enough, increased to 30mg BID. Restarted Hysingla 40mg qdaily. Cannot tolerate Morphine, Codeine, Oxycodone. May need to restart Norco 10mg QID prn if insurance changes.  Cont Gabapentin 1200mg TID, cannot tolerate Lyrica, written by PCP.  Cont RxAlt #2 cream, helps.  Cont Cymbalta.  Began Phenergan 25mg TID prn.  [facility] records unavailable.  RTC 3 months for f/u. Will send 3rd script in 2 months without an appt. Stable.

## 2022-05-10 DIAGNOSIS — G62.9 PERIPHERAL POLYNEUROPATHY: ICD-10-CM

## 2022-05-10 RX ORDER — GABAPENTIN 600 MG/1
1200 TABLET ORAL 3 TIMES DAILY
Qty: 540 TABLET | Refills: 2 | Status: SHIPPED | OUTPATIENT
Start: 2022-05-10 | End: 2022-08-12 | Stop reason: SDUPTHER

## 2022-07-19 DIAGNOSIS — M79.605 LEG PAIN, BILATERAL: ICD-10-CM

## 2022-07-19 DIAGNOSIS — M79.604 LEG PAIN, BILATERAL: ICD-10-CM

## 2022-07-19 RX ORDER — HYDROCODONE BITARTRATE 40 MG/1
1 TABLET, EXTENDED RELEASE ORAL DAILY
Qty: 30 EACH | Refills: 0 | Status: SHIPPED | OUTPATIENT
Start: 2022-07-19 | End: 2022-08-12 | Stop reason: SDUPTHER

## 2022-07-19 NOTE — TELEPHONE ENCOUNTER
Rx Refill Note  Requested Prescriptions     Pending Prescriptions Disp Refills   • HYDROcodone Bitartrate ER (Hysingla ER) 40 MG tablet extended-release 24 hour  30 each 0     Sig: Take 1 tablet by mouth Daily.      Last office visit with prescribing clinician: 5/6/2022      Next office visit with prescribing clinician: 8/12/2022            Gisell Villatoro MA  07/19/22, 11:35 EDT

## 2022-07-25 DIAGNOSIS — M79.672 PAIN IN BOTH FEET: ICD-10-CM

## 2022-07-25 DIAGNOSIS — M79.605 LEG PAIN, BILATERAL: ICD-10-CM

## 2022-07-25 DIAGNOSIS — M79.604 LEG PAIN, BILATERAL: ICD-10-CM

## 2022-07-25 DIAGNOSIS — M79.671 PAIN IN BOTH FEET: ICD-10-CM

## 2022-07-25 RX ORDER — HYDROCODONE BITARTRATE 40 MG/1
1 TABLET, EXTENDED RELEASE ORAL DAILY
Qty: 30 EACH | Refills: 0 | Status: SHIPPED | OUTPATIENT
Start: 2022-07-25 | End: 2022-08-12 | Stop reason: SDUPTHER

## 2022-07-25 NOTE — TELEPHONE ENCOUNTER
Rx Refill Note  Requested Prescriptions     Pending Prescriptions Disp Refills   • HYDROcodone Bitartrate ER (Hysingla ER) 40 MG tablet extended-release 24 hour  30 each 0     Sig: Take 1 tablet by mouth Daily.      Last office visit with prescribing clinician: 5/6/2022      Next office visit with prescribing clinician: 8/12/2022            Gisell Villatoro MA  07/25/22, 11:54 EDT

## 2022-08-12 ENCOUNTER — OFFICE VISIT (OUTPATIENT)
Dept: PAIN MEDICINE | Facility: CLINIC | Age: 67
End: 2022-08-12

## 2022-08-12 VITALS
SYSTOLIC BLOOD PRESSURE: 124 MMHG | OXYGEN SATURATION: 98 % | BODY MASS INDEX: 25.03 KG/M2 | HEIGHT: 69 IN | HEART RATE: 84 BPM | WEIGHT: 169 LBS | RESPIRATION RATE: 16 BRPM | DIASTOLIC BLOOD PRESSURE: 75 MMHG

## 2022-08-12 DIAGNOSIS — G62.9 PERIPHERAL POLYNEUROPATHY: ICD-10-CM

## 2022-08-12 DIAGNOSIS — R39.89 BLADDER PAIN: ICD-10-CM

## 2022-08-12 DIAGNOSIS — M70.61 GREATER TROCHANTERIC BURSITIS OF BOTH HIPS: ICD-10-CM

## 2022-08-12 DIAGNOSIS — M79.672 PAIN IN BOTH FEET: ICD-10-CM

## 2022-08-12 DIAGNOSIS — M53.3 SACROILIAC DYSFUNCTION: ICD-10-CM

## 2022-08-12 DIAGNOSIS — M70.62 GREATER TROCHANTERIC BURSITIS OF BOTH HIPS: ICD-10-CM

## 2022-08-12 DIAGNOSIS — M79.605 LEG PAIN, BILATERAL: ICD-10-CM

## 2022-08-12 DIAGNOSIS — Z79.899 OTHER LONG TERM (CURRENT) DRUG THERAPY: Primary | ICD-10-CM

## 2022-08-12 DIAGNOSIS — M79.671 PAIN IN BOTH FEET: ICD-10-CM

## 2022-08-12 DIAGNOSIS — M79.604 LEG PAIN, BILATERAL: ICD-10-CM

## 2022-08-12 PROCEDURE — 99213 OFFICE O/P EST LOW 20 MIN: CPT | Performed by: PHYSICAL MEDICINE & REHABILITATION

## 2022-08-12 RX ORDER — HYDROCODONE BITARTRATE 40 MG/1
1 TABLET, EXTENDED RELEASE ORAL DAILY
Qty: 30 EACH | Refills: 0 | Status: SHIPPED | OUTPATIENT
Start: 2022-08-12 | End: 2022-10-07 | Stop reason: SDUPTHER

## 2022-08-12 RX ORDER — GABAPENTIN 600 MG/1
1200 TABLET ORAL 3 TIMES DAILY
Qty: 540 TABLET | Refills: 1 | Status: SHIPPED | OUTPATIENT
Start: 2022-08-12 | End: 2023-01-13 | Stop reason: SDUPTHER

## 2022-08-12 NOTE — PROGRESS NOTES
Subjective   Angella Srivastava is a 67 y.o. female.     Bilateral feet pain, also posterior leg pain, LLE > RLE. Dz with peripheral neuropathy in 2005, 10/10 at worst, 5/10 at best, burning, cold, pins & needles, always present, varies, worst in morning and at night, interferes with all activities that involve walking. Had NCS with PN. Started on Darvocet in past, taking Norco 10/325mg about QID prn with benefit at initial visit. Lyrica helped but caused severe cramping, taking Gabapentin 600mg QID at initial visit, also Cymbalta. Referred for pain management. Continued Norco, Gabapentin at 1200mg TID. Norco not lasting 6 hours, started Zohydro 20mg BID, then 30mg BID, can't get anymore, restarted Norco 10mg QID prn. Started CBD oil, helps thumb pain. Worsening nausea. Did better with Hysingla 40mg qdaily.       The following portions of the patient's history were reviewed and updated as appropriate: allergies, current medications, past family history, past medical history, past social history, past surgical history and problem list.    Review of Systems   Constitutional: Negative for chills, fatigue and fever.   HENT: Negative for hearing loss and trouble swallowing.    Eyes: Negative for visual disturbance.   Respiratory: Negative for shortness of breath.    Cardiovascular: Negative for chest pain.   Gastrointestinal: Positive for nausea. Negative for abdominal pain, constipation, diarrhea and vomiting.   Genitourinary: Negative for urinary incontinence.   Musculoskeletal: Positive for arthralgias and back pain. Negative for joint swelling, myalgias and neck pain.   Neurological: Positive for weakness and numbness. Negative for dizziness and headache.       Objective   Physical Exam   Constitutional: She is oriented to person, place, and time. She appears well-developed and well-nourished.   HENT:   Head: Normocephalic and atraumatic.   Eyes: Pupils are equal, round, and reactive to light.   Cardiovascular: Normal  rate, regular rhythm and normal heart sounds.   Pulmonary/Chest: Breath sounds normal.   Abdominal: Soft. Bowel sounds are normal. She exhibits no distension. There is no abdominal tenderness.   Neurological: She is alert and oriented to person, place, and time. She has normal reflexes. She displays normal reflexes. A sensory deficit is present.   Decreased in stocking distribution up to mid-calf b/l   Psychiatric: Her behavior is normal. Thought content normal.         Assessment & Plan   Diagnoses and all orders for this visit:    1. Leg pain, bilateral (Primary)    2. Pain in both feet    3. Peripheral polyneuropathy    4. Sacroiliac dysfunction    5. Other long term (current) drug therapy    6. Greater trochanteric bursitis of both hips    7. Bladder pain        [facility] and Martin reviewed, in order. Low risk per SOAPP. Repeat UDS 8/27/21 in order.  Stopped Norco 10/325mg QID prn, rotated to Hysingla 40mg qdaily, worked but pharmacy won't carry, rotated to generic Hydrocodone ER 20mg BID, not strong enough, increased to 30mg BID. Restarted Hysingla 40mg qdaily. Cannot tolerate Morphine, Codeine, Oxycodone. May need to restart Norco 10mg QID prn if insurance changes.  Cont Gabapentin 1200mg TID, cannot tolerate Lyrica, written by PCP.  Cont RxAlt #2 cream, helps.  Cont Cymbalta.  Began Phenergan 25mg TID prn.  [facility] records unavailable.  RTC 3 months for f/u. Will send 3rd script in 2 months without an appt. Stable.

## 2022-10-07 ENCOUNTER — OFFICE VISIT (OUTPATIENT)
Dept: PAIN MEDICINE | Facility: CLINIC | Age: 67
End: 2022-10-07

## 2022-10-07 VITALS
DIASTOLIC BLOOD PRESSURE: 87 MMHG | OXYGEN SATURATION: 98 % | SYSTOLIC BLOOD PRESSURE: 133 MMHG | HEART RATE: 81 BPM | RESPIRATION RATE: 16 BRPM

## 2022-10-07 DIAGNOSIS — M70.62 GREATER TROCHANTERIC BURSITIS OF BOTH HIPS: ICD-10-CM

## 2022-10-07 DIAGNOSIS — M79.671 PAIN IN BOTH FEET: ICD-10-CM

## 2022-10-07 DIAGNOSIS — M79.605 LEG PAIN, BILATERAL: Primary | ICD-10-CM

## 2022-10-07 DIAGNOSIS — M79.672 PAIN IN BOTH FEET: ICD-10-CM

## 2022-10-07 DIAGNOSIS — Z79.899 OTHER LONG TERM (CURRENT) DRUG THERAPY: ICD-10-CM

## 2022-10-07 DIAGNOSIS — M53.3 SACROILIAC DYSFUNCTION: ICD-10-CM

## 2022-10-07 DIAGNOSIS — R39.89 BLADDER PAIN: ICD-10-CM

## 2022-10-07 DIAGNOSIS — M70.61 GREATER TROCHANTERIC BURSITIS OF BOTH HIPS: ICD-10-CM

## 2022-10-07 DIAGNOSIS — M79.604 LEG PAIN, BILATERAL: Primary | ICD-10-CM

## 2022-10-07 DIAGNOSIS — G62.9 PERIPHERAL POLYNEUROPATHY: ICD-10-CM

## 2022-10-07 PROCEDURE — 99213 OFFICE O/P EST LOW 20 MIN: CPT | Performed by: PHYSICAL MEDICINE & REHABILITATION

## 2022-10-07 RX ORDER — HYDROCODONE BITARTRATE 40 MG/1
1 TABLET, EXTENDED RELEASE ORAL DAILY
Qty: 30 EACH | Refills: 0 | Status: SHIPPED | OUTPATIENT
Start: 2022-10-07 | End: 2023-01-13 | Stop reason: SDUPTHER

## 2022-10-07 RX ORDER — HYDROCODONE BITARTRATE 40 MG/1
1 TABLET, EXTENDED RELEASE ORAL DAILY
Qty: 30 EACH | Refills: 0 | Status: SHIPPED | OUTPATIENT
Start: 2022-10-07 | End: 2022-12-13 | Stop reason: SDUPTHER

## 2022-10-07 NOTE — PROGRESS NOTES
Subjective   Angella Srivastava is a 67 y.o. female.     History of Present Illness  Bilateral feet pain, also posterior leg pain, LLE > RLE. Dz with peripheral neuropathy in 2005, 10/10 at worst, 5/10 at best, burning, cold, pins & needles, always present, varies, worst in morning and at night, interferes with all activities that involve walking. Had NCS with PN. Started on Darvocet in past, taking Norco 10/325mg about QID prn with benefit at initial visit. Lyrica helped but caused severe cramping, taking Gabapentin 600mg QID at initial visit, also Cymbalta. Referred for pain management. Continued Norco, Gabapentin at 1200mg TID. Norco not lasting 6 hours, started Zohydro 20mg BID, then 30mg BID, can't get anymore, restarted Norco 10mg QID prn. Started CBD oil, helps thumb pain. Worsening nausea. Did better with Hysingla 40mg qdaily.  Leg Pain   Associated symptoms include numbness.        The following portions of the patient's history were reviewed and updated as appropriate: allergies, current medications, past family history, past medical history, past social history, past surgical history and problem list.    Review of Systems   Constitutional: Negative for chills, fatigue and fever.   HENT: Negative for hearing loss and trouble swallowing.    Eyes: Negative for visual disturbance.   Respiratory: Negative for shortness of breath.    Cardiovascular: Negative for chest pain.   Gastrointestinal: Positive for nausea. Negative for abdominal pain, constipation, diarrhea and vomiting.   Genitourinary: Negative for urinary incontinence.   Musculoskeletal: Positive for arthralgias and back pain. Negative for joint swelling, myalgias and neck pain.   Neurological: Positive for weakness and numbness. Negative for dizziness and headache.       Objective   Physical Exam   Constitutional: She is oriented to person, place, and time. She appears well-developed and well-nourished.   HENT:   Head: Normocephalic and atraumatic.    Eyes: Pupils are equal, round, and reactive to light.   Cardiovascular: Normal rate, regular rhythm and normal heart sounds.   Pulmonary/Chest: Breath sounds normal.   Abdominal: Soft. Bowel sounds are normal. She exhibits no distension. There is no abdominal tenderness.   Neurological: She is alert and oriented to person, place, and time. She has normal reflexes. She displays normal reflexes. A sensory deficit is present.   Decreased in stocking distribution up to mid-calf b/l   Psychiatric: Her behavior is normal. Thought content normal.         Assessment & Plan   Diagnoses and all orders for this visit:    1. Leg pain, bilateral (Primary)    2. Bladder pain    3. Greater trochanteric bursitis of both hips    4. Other long term (current) drug therapy    5. Pain in both feet    6. Peripheral polyneuropathy    7. Sacroiliac dysfunction        [facility] and [facility] reviewed, in order. Low risk per SOAPP. Repeat UDS 8/12/22 in order.  Stopped Norco 10/325mg QID prn, rotated to Hysingla 40mg qdaily, worked but pharmacy won't carry, rotated to generic Hydrocodone ER 20mg BID, not strong enough, increased to 30mg BID. Restarted Hysingla 40mg qdaily. Cannot tolerate Morphine, Codeine, Oxycodone. May need to restart Norco 10mg QID prn if insurance changes.  Cont Gabapentin 1200mg TID, cannot tolerate Lyrica, written by PCP.  Cont RxAlt #2 cream, helps.  Cont Cymbalta.  Began Phenergan 25mg TID prn.  [facility] records unavailable.  RTC 3 months for f/u. Will send 3rd script in 2 months without an appt. Stable. Cannot fill on Sundays.

## 2022-12-13 ENCOUNTER — TELEPHONE (OUTPATIENT)
Dept: PAIN MEDICINE | Facility: CLINIC | Age: 67
End: 2022-12-13

## 2022-12-13 DIAGNOSIS — M79.671 PAIN IN BOTH FEET: ICD-10-CM

## 2022-12-13 DIAGNOSIS — M79.604 LEG PAIN, BILATERAL: ICD-10-CM

## 2022-12-13 DIAGNOSIS — M79.605 LEG PAIN, BILATERAL: ICD-10-CM

## 2022-12-13 DIAGNOSIS — M79.672 PAIN IN BOTH FEET: ICD-10-CM

## 2022-12-13 RX ORDER — HYDROCODONE BITARTRATE 40 MG/1
1 TABLET, EXTENDED RELEASE ORAL DAILY
Qty: 30 EACH | Refills: 0 | Status: SHIPPED | OUTPATIENT
Start: 2022-12-13 | End: 2023-01-13 | Stop reason: SDUPTHER

## 2023-01-12 ENCOUNTER — TELEPHONE (OUTPATIENT)
Dept: PAIN MEDICINE | Facility: CLINIC | Age: 68
End: 2023-01-12

## 2023-01-12 NOTE — TELEPHONE ENCOUNTER
Caller: Angella Srivastava    Relationship: Self    Best call back number:     What is the best time to reach you: ANY     Who are you requesting to speak with (clinical staff, provider,  specific staff member): CLINICAL    What was the call regarding: PATIENT HAS BEEN FIGHTING SMALL ILLNESS ALL WEEK.  SHE SAID NOW SHE IS FEVER CLEAR FOR SEVERAL DAYS BUT STILL HAS A COUGH AND SHE IS WONDERING IF SHE COULD DO A TELEHEALTH APPT JUST IN CASE    Do you require a callback: YES

## 2023-01-13 ENCOUNTER — OFFICE VISIT (OUTPATIENT)
Dept: PAIN MEDICINE | Facility: CLINIC | Age: 68
End: 2023-01-13
Payer: COMMERCIAL

## 2023-01-13 VITALS
OXYGEN SATURATION: 97 % | RESPIRATION RATE: 16 BRPM | HEART RATE: 78 BPM | SYSTOLIC BLOOD PRESSURE: 143 MMHG | DIASTOLIC BLOOD PRESSURE: 80 MMHG

## 2023-01-13 DIAGNOSIS — M70.61 GREATER TROCHANTERIC BURSITIS OF BOTH HIPS: ICD-10-CM

## 2023-01-13 DIAGNOSIS — M53.3 SACROILIAC DYSFUNCTION: ICD-10-CM

## 2023-01-13 DIAGNOSIS — M79.604 LEG PAIN, BILATERAL: ICD-10-CM

## 2023-01-13 DIAGNOSIS — M79.671 PAIN IN BOTH FEET: Primary | ICD-10-CM

## 2023-01-13 DIAGNOSIS — M79.672 PAIN IN BOTH FEET: Primary | ICD-10-CM

## 2023-01-13 DIAGNOSIS — Z79.899 OTHER LONG TERM (CURRENT) DRUG THERAPY: ICD-10-CM

## 2023-01-13 DIAGNOSIS — M79.605 LEG PAIN, BILATERAL: ICD-10-CM

## 2023-01-13 DIAGNOSIS — G62.9 PERIPHERAL POLYNEUROPATHY: ICD-10-CM

## 2023-01-13 DIAGNOSIS — M70.62 GREATER TROCHANTERIC BURSITIS OF BOTH HIPS: ICD-10-CM

## 2023-01-13 PROCEDURE — 99213 OFFICE O/P EST LOW 20 MIN: CPT | Performed by: PHYSICAL MEDICINE & REHABILITATION

## 2023-01-13 RX ORDER — HYDROCODONE BITARTRATE 40 MG/1
1 TABLET, EXTENDED RELEASE ORAL DAILY
Qty: 30 EACH | Refills: 0 | Status: SHIPPED | OUTPATIENT
Start: 2023-01-13 | End: 2023-03-14 | Stop reason: SDUPTHER

## 2023-01-13 RX ORDER — HYDROCODONE BITARTRATE 40 MG/1
1 TABLET, EXTENDED RELEASE ORAL DAILY
Qty: 30 EACH | Refills: 0 | Status: SHIPPED | OUTPATIENT
Start: 2023-01-13

## 2023-01-13 RX ORDER — GABAPENTIN 600 MG/1
1200 TABLET ORAL 3 TIMES DAILY
Qty: 540 TABLET | Refills: 1 | Status: SHIPPED | OUTPATIENT
Start: 2023-01-13

## 2023-01-13 NOTE — PROGRESS NOTES
Subjective   Angella Srivastava is a 67 y.o. female.     History of Present Illness  Bilateral feet pain, also posterior leg pain, LLE > RLE. Dz with peripheral neuropathy in 2005, 10/10 at worst, 5/10 at best, burning, cold, pins & needles, always present, varies, worst in morning and at night, interferes with all activities that involve walking. Had NCS with PN. Started on Darvocet in past, taking Norco 10/325mg about QID prn with benefit at initial visit. Lyrica helped but caused severe cramping, taking Gabapentin 600mg QID at initial visit, also Cymbalta. Referred for pain management. Continued Norco, Gabapentin at 1200mg TID. Norco not lasting 6 hours, started Zohydro 20mg BID, then 30mg BID, can't get anymore, restarted Norco 10mg QID prn. Started CBD oil, helps thumb pain. Worsening nausea. Did better with Hysingla 40mg qdaily.  Leg Pain   Associated symptoms include numbness.        The following portions of the patient's history were reviewed and updated as appropriate: allergies, current medications, past family history, past medical history, past social history, past surgical history and problem list.    Review of Systems   Constitutional: Negative for chills, fatigue and fever.   HENT: Negative for hearing loss and trouble swallowing.    Eyes: Negative for visual disturbance.   Respiratory: Negative for shortness of breath.    Cardiovascular: Negative for chest pain.   Gastrointestinal: Positive for nausea. Negative for abdominal pain, constipation, diarrhea and vomiting.   Genitourinary: Negative for urinary incontinence.   Musculoskeletal: Positive for arthralgias and back pain. Negative for joint swelling, myalgias and neck pain.   Neurological: Positive for weakness and numbness. Negative for dizziness and headache.       Objective   Physical Exam   Constitutional: She is oriented to person, place, and time. She appears well-developed and well-nourished.   HENT:   Head: Normocephalic and atraumatic.    Eyes: Pupils are equal, round, and reactive to light.   Cardiovascular: Normal rate, regular rhythm and normal heart sounds.   Pulmonary/Chest: Breath sounds normal.   Abdominal: Soft. Bowel sounds are normal. She exhibits no distension. There is no abdominal tenderness.   Neurological: She is alert and oriented to person, place, and time. She has normal reflexes. She displays normal reflexes. A sensory deficit is present.   Decreased in stocking distribution up to mid-calf b/l   Psychiatric: Her behavior is normal. Thought content normal.         Assessment & Plan   Diagnoses and all orders for this visit:    1. Pain in both feet (Primary)    2. Leg pain, bilateral    3. Greater trochanteric bursitis of both hips    4. Other long term (current) drug therapy    5. Peripheral polyneuropathy    6. Sacroiliac dysfunction        [facility] and Martin reviewed, in order. Low risk per SOAPP. Repeat UDS 8/12/22 in order.  Stopped Norco 10/325mg QID prn, rotated to Hysingla 40mg qdaily, worked but pharmacy won't carry, rotated to generic Hydrocodone ER 20mg BID, not strong enough, increased to 30mg BID. Restarted Hysingla 40mg qdaily. Cannot tolerate Morphine, Codeine, Oxycodone. May need to restart Norco 10mg QID prn if insurance changes.  Cont Gabapentin 1200mg TID, cannot tolerate Lyrica, written by PCP.  Cont RxAlt #2 cream, helps.  Cont Cymbalta.  Began Phenergan 25mg TID prn.  [facility] records unavailable.  RTC 3 months for f/u. Will send 3rd script in 2 months without an appt. Stable. Cannot fill on Sundays.

## 2023-02-17 NOTE — TELEPHONE ENCOUNTER
"Daily progress note    Primary care physician  Dr. Delgado    Chief complaint  Doing same with no new complaints and tolerating liquid diet    History of present illness  65-year-old female with history of hypertension presented to [facility] emergency room with left lower quadrant abdominal pain for last 1 month.  Patient also have occasional loose stools.  Patient denies any nausea vomiting.  Patient recently diagnosed with colitis and treated with oral antibiotics without any improvement.  Patient has noticed blood in the stools.  Patient evaluated in ER with CT abdominal pelvis which shows worsening colitis and failed outpatient treatment admit for management.  Patient has no fever chills chest pain shortness of breath with sweats weight loss or weight gain.     REVIEW OF SYSTEMS  Unremarkable except weakness     PHYSICAL EXAM  Blood pressure 138/87, pulse 95, temperature 98.6 °F (37 °C), temperature source Oral, resp. rate 18, height 154.9 cm (61\"), weight 75.7 kg (166 lb 14.2 oz), SpO2 98 %.    GENERAL:  Awake and alert in no acute distress  HEENT:  Unremarkable  NECK:  Supple  CV: regular rhythm, normal rate  RESPIRATORY: normal effort, clear to auscultation bilaterally  ABDOMEN: soft, left lower quadrant tenderness colostomy in place  MUSCULOSKELETAL: no deformity  NEURO: alert, moves all extremities, follows commands  PSYCH:  calm, cooperative  SKIN: warm, dry     LAB RESULTS  Lab Results (last 24 hours)     Procedure Component Value Units Date/Time    Body Fluid Culture - Drainage, Peritoneum [[id number]] Collected: 02/14/23 1154    Specimen: Drainage from Peritoneum Updated: 02/17/23 1057     Body Fluid Culture No growth at 3 days     Gram Stain Moderate (3+) WBCs per low power field      No organisms seen    Anaerobic Culture - Drainage, Peritoneum [[id number]]  (Normal) Collected: 02/14/23 1154    Specimen: Drainage from Peritoneum Updated: 02/17/23 0703     Anaerobic Culture No anaerobes isolated " PA sent to insurance waiting on approval/denial.   at 3 days    Manual Differential [[id number]]  (Abnormal) Collected: 02/17/23 0308    Specimen: Blood Updated: 02/17/23 0435     Neutrophil % 79.0 %      Lymphocyte % 8.0 %      Monocyte % 9.0 %      Basophil % 1.0 %      Myelocyte % 3.0 %      Neutrophils Absolute 15.57 10*3/mm3      Lymphocytes Absolute 1.58 10*3/mm3      Monocytes Absolute 1.77 10*3/mm3      Basophils Absolute 0.20 10*3/mm3      Hypochromia Slight/1+     Microcytes Slight/1+     Polychromasia Slight/1+     WBC Morphology Normal     Platelet Morphology Normal    CBC & Differential [[id number]]  (Abnormal) Collected: 02/17/23 0308    Specimen: Blood Updated: 02/17/23 0419    Narrative:      The following orders were created for panel order CBC & Differential.  Procedure                               Abnormality         Status                     ---------                               -----------         ------                     CBC Auto Differential[[id number]]        Abnormal            Final result                 Please view results for these tests on the individual orders.    CBC Auto Differential [[id number]]  (Abnormal) Collected: 02/17/23 0308    Specimen: Blood Updated: 02/17/23 0419     WBC 19.71 10*3/mm3      RBC 3.18 10*6/mm3      Hemoglobin 9.0 g/dL      Hematocrit 27.0 %      MCV 84.9 fL      MCH 28.3 pg      MCHC 33.3 g/dL      RDW 14.2 %      RDW-SD 43.8 fl      MPV 9.5 fL      Platelets 493 10*3/mm3      nRBC 0.0 /100 WBC     Comprehensive Metabolic Panel [[id number]]  (Abnormal) Collected: 02/17/23 0308    Specimen: Blood Updated: 02/17/23 0417     Glucose 177 mg/dL      BUN 11 mg/dL      Creatinine 0.45 mg/dL      Sodium 133 mmol/L      Potassium 4.3 mmol/L      Chloride 104 mmol/L      CO2 23.1 mmol/L      Calcium 8.1 mg/dL      Total Protein 6.2 g/dL      Albumin 2.2 g/dL      ALT (SGPT) 6 U/L      AST (SGOT) 12 U/L      Alkaline Phosphatase 59 U/L      Total Bilirubin 0.3 mg/dL      Globulin 4.0 gm/dL      A/G Ratio 0.6 g/dL       BUN/Creatinine Ratio 24.4     Anion Gap 5.9 mmol/L      eGFR 108.3 mL/min/1.73     Narrative:      GFR Normal >60  Chronic Kidney Disease <60  Kidney Failure <15      Phosphorus [[id number]]  (Normal) Collected: 02/17/23 0308    Specimen: Blood Updated: 02/17/23 0417     Phosphorus 2.7 mg/dL     Magnesium [[id number]]  (Normal) Collected: 02/17/23 0308    Specimen: Blood Updated: 02/17/23 0417     Magnesium 1.6 mg/dL     POC Glucose Once [[id number]]  (Abnormal) Collected: 02/17/23 0015    Specimen: Blood Updated: 02/17/23 0017     Glucose 203 mg/dL      Comment: Meter: [id number] : [id number] Han Kuo CNA       POC Glucose Once [[id number]]  (Abnormal) Collected: 02/16/23 1621    Specimen: Blood Updated: 02/16/23 1622     Glucose 171 mg/dL      Comment: Meter: [id number] : [id number] Ohara Aniah NA       POC Glucose Once [[id number]]  (Abnormal) Collected: 02/16/23 1105    Specimen: Blood Updated: 02/16/23 1107     Glucose 191 mg/dL      Comment: Meter: [id number] : [id number] Ohara Aniah NA           Imaging Results (Last 24 Hours)     ** No results found for the last 24 hours. **        Upper and lower endoscopy results noted and discussed with patient    Current Facility-Administered Medications:   •  acetaminophen (TYLENOL) tablet 650 mg, 650 mg, Oral, Q6H PRN, Adwoa Rogel PA-C, 650 mg at 02/17/23 0638  •  Adult Central 2-in-1 TPN, , Intravenous, Continuous, Lino Gaston MD, Last Rate: 75 mL/hr at 02/16/23 1830, New Bag at 02/16/23 1830  •  amLODIPine (NORVASC) tablet 10 mg, 10 mg, Oral, Q24H, Gideon Hope MD, 10 mg at 02/15/23 0930  •  cefTRIAXone (ROCEPHIN) 2 g in sodium chloride 0.9 % 100 mL IVPB-VTB, 2 g, Intravenous, Q24H, Beck Wei Jr., MD, Last Rate: 200 mL/hr at 02/16/23 1400, 2 g at 02/16/23 1400  •  dextrose (D50W) (25 g/50 mL) IV injection 25 g, 25 g, Intravenous, Q15 Min PRN, Beck Wei Jr., MD  •  dextrose (GLUTOSE) oral gel 15 g, 15 g, Oral, Q15 Min PRN, Eriberto  Beck Boyle MD  •  famotidine (PEPCID) injection 20 mg, 20 mg, Intravenous, BID Josiah NICHOLS Brittany A, PA-C, 20 mg at 23 1030  •  Fat Emulsion Plant Based (INTRALIPID,LIPOSYN) 20 % infusion 20 g, 100 mL, Intravenous, Q24H (TPN), Lino Gaston MD, Last Rate: 8.33 mL/hr at 23 182, 20 g at 23  •  fluconazole (DIFLUCAN) IVPB 400 mg, 400 mg, Intravenous, Q24H, Galdino Esteves MD, Last Rate: 100 mL/hr at 23 1543, 400 mg at 23 1543  •  glucagon (GLUCAGEN) injection 1 mg, 1 mg, Intramuscular, Q15 Min PRN, Beck Wei Jr., MD  •  hydrALAZINE (APRESOLINE) injection 10 mg, 10 mg, Intravenous, Q4H PRN, Gideon Hope MD, 10 mg at 23 1454  •  insulin glargine (LANTUS, SEMGLEE) injection 10 Units, 10 Units, Subcutaneous, Q12H, Gideon Hope MD, 10 Units at 23 0846  •  insulin regular (humuLIN R,novoLIN R) injection 0-7 Units, 0-7 Units, Subcutaneous, Q6H, Beck Wei Jr., MD, 2 Units at 23 0604  •  metoclopramide (REGLAN) injection 10 mg, 10 mg, Intravenous, Q6H, Lino Gaston MD, 10 mg at 23 0604  •  metroNIDAZOLE (FLAGYL) IVPB 500 mg, 500 mg, Intravenous, Q8H, Galdino Esteves MD, 500 mg at 23 0604  •  [] HYDROmorphone (DILAUDID) injection 0.25 mg, 0.25 mg, Intravenous, Q2H PRN, 0.25 mg at 23 0015 **AND** naloxone (NARCAN) injection 0.4 mg, 0.4 mg, Intravenous, Q5 Min PRN, Lino Gaston MD  •  nitroglycerin (NITROSTAT) SL tablet 0.4 mg, 0.4 mg, Sublingual, Q5 Min PRN, Lino Gaston MD  •  ondansetron (ZOFRAN) injection 4 mg, 4 mg, Intravenous, Q6H PRN, Lino Gaston MD, 4 mg at 23 0041  •  Pharmacy to Dose TPN, , Does not apply, Continuous PRN, Lilibeth Rahman PA-C  •  potassium chloride 10 mEq in 100 mL IVPB, 10 mEq, Intravenous, Q1H PRN, Matthew Soliz MD, Last Rate: 100 mL/hr at 23, 10 mEq at 23  •  potassium phosphate 45 mmol in sodium chloride 0.9 % 500 mL infusion, 45 mmol, Intravenous,  PRN **OR** potassium phosphate 30 mmol in sodium chloride 0.9 % 250 mL infusion, 30 mmol, Intravenous, PRN **OR** potassium phosphate 15 mmol in sodium chloride 0.9 % 100 mL infusion, 15 mmol, Intravenous, PRN, 15 mmol at 02/12/23 1440 **OR** sodium phosphates 40 mmol in sodium chloride 0.9 % 500 mL IVPB, 40 mmol, Intravenous, PRN **OR** sodium phosphates 20 mmol in sodium chloride 0.9 % 250 mL IVPB, 20 mmol, Intravenous, PRN, Matthew Soliz MD  •  sodium chloride 0.9 % flush 10 mL, 10 mL, Intravenous, Q12H, Quick, Lilibeth A, PA-C, 10 mL at 02/16/23 2208  •  sodium chloride 0.9 % flush 10 mL, 10 mL, Intravenous, Q12H, Quick, Lilibeth A, PA-C, 10 mL at 02/17/23 1036  •  sodium chloride 0.9 % flush 10 mL, 10 mL, Intravenous, PRN, Quick, Lilibeth A, PA-C  •  sodium chloride 0.9 % flush 20 mL, 20 mL, Intravenous, PRN, Quick, Lilibeth A, PA-C  •  sodium chloride 0.9 % infusion 40 mL, 40 mL, Intravenous, PRN, Quick, Lilibeth A, PA-C     ASSESSMENT  Acute colitis with sigmoid stricture and microperforation s/p colon resection and colostomy  Abdominal fluid collection/abscess status post CT-guided drainage  Postop ileus  Acute kidney injury resolved  Hypertension   Gastroesophageal reflux disease    PLAN  CPM  Postop care  TPN  Clear liquid diet  IV antibiotics per infectious disease  Accu-Chek with low-dose sliding scale insulin  Blood pressure medications adjusted  Pain management  Continue home medications  Stress ulcer DVT prophylaxis  Supportive care  PT OT  Discussed with family nursing staff   Follow closely and further recommendation according to hospital course    SANG SINGLETARY MD    Copied text in this note has been reviewed and is accurate as of 02/17/23

## 2023-03-14 DIAGNOSIS — M79.605 LEG PAIN, BILATERAL: ICD-10-CM

## 2023-03-14 DIAGNOSIS — M79.604 LEG PAIN, BILATERAL: ICD-10-CM

## 2023-03-14 RX ORDER — HYDROCODONE BITARTRATE 40 MG/1
1 TABLET, EXTENDED RELEASE ORAL DAILY
Qty: 30 EACH | Refills: 0 | Status: SHIPPED | OUTPATIENT
Start: 2023-03-14

## 2023-04-11 ENCOUNTER — OFFICE VISIT (OUTPATIENT)
Dept: PAIN MEDICINE | Facility: CLINIC | Age: 68
End: 2023-04-11
Payer: COMMERCIAL

## 2023-04-11 VITALS
SYSTOLIC BLOOD PRESSURE: 168 MMHG | RESPIRATION RATE: 16 BRPM | HEART RATE: 79 BPM | OXYGEN SATURATION: 97 % | DIASTOLIC BLOOD PRESSURE: 75 MMHG

## 2023-04-11 DIAGNOSIS — M53.3 SACROILIAC DYSFUNCTION: ICD-10-CM

## 2023-04-11 DIAGNOSIS — G62.9 PERIPHERAL POLYNEUROPATHY: ICD-10-CM

## 2023-04-11 DIAGNOSIS — M70.61 GREATER TROCHANTERIC BURSITIS OF BOTH HIPS: ICD-10-CM

## 2023-04-11 DIAGNOSIS — M79.604 LEG PAIN, BILATERAL: Primary | ICD-10-CM

## 2023-04-11 DIAGNOSIS — Z79.899 OTHER LONG TERM (CURRENT) DRUG THERAPY: ICD-10-CM

## 2023-04-11 DIAGNOSIS — M70.62 GREATER TROCHANTERIC BURSITIS OF BOTH HIPS: ICD-10-CM

## 2023-04-11 DIAGNOSIS — M79.672 PAIN IN BOTH FEET: ICD-10-CM

## 2023-04-11 DIAGNOSIS — M79.605 LEG PAIN, BILATERAL: Primary | ICD-10-CM

## 2023-04-11 DIAGNOSIS — M79.671 PAIN IN BOTH FEET: ICD-10-CM

## 2023-04-11 PROCEDURE — 99213 OFFICE O/P EST LOW 20 MIN: CPT | Performed by: PHYSICAL MEDICINE & REHABILITATION

## 2023-04-11 RX ORDER — HYDROCODONE BITARTRATE 40 MG/1
1 TABLET, EXTENDED RELEASE ORAL DAILY
Qty: 30 EACH | Refills: 0 | Status: SHIPPED | OUTPATIENT
Start: 2023-04-11

## 2023-04-11 NOTE — PROGRESS NOTES
Subjective   Angella Srivastava is a 67 y.o. female.     History of Present Illness  Bilateral feet pain, also posterior leg pain, LLE > RLE. Dz with peripheral neuropathy in 2005, 10/10 at worst, 5/10 at best, burning, cold, pins & needles, always present, varies, worst in morning and at night, interferes with all activities that involve walking. Had NCS with PN. Started on Darvocet in past, taking Norco 10/325mg about QID prn with benefit at initial visit. Lyrica helped but caused severe cramping, taking Gabapentin 600mg QID at initial visit, also Cymbalta. Referred for pain management. Continued Norco, Gabapentin at 1200mg TID. Norco not lasting 6 hours, started Zohydro 20mg BID, then 30mg BID, can't get anymore, restarted Norco 10mg QID prn. Started CBD oil, helps thumb pain. Worsening nausea. Did better with Hysingla 40mg qdaily.  Leg Pain   Associated symptoms include numbness.        The following portions of the patient's history were reviewed and updated as appropriate: allergies, current medications, past family history, past medical history, past social history, past surgical history and problem list.    Review of Systems   Constitutional: Negative for chills, fatigue and fever.   HENT: Negative for hearing loss and trouble swallowing.    Eyes: Negative for visual disturbance.   Respiratory: Negative for shortness of breath.    Cardiovascular: Negative for chest pain.   Gastrointestinal: Positive for nausea. Negative for abdominal pain, constipation, diarrhea and vomiting.   Genitourinary: Negative for urinary incontinence.   Musculoskeletal: Positive for arthralgias and back pain. Negative for joint swelling, myalgias and neck pain.   Neurological: Positive for weakness and numbness. Negative for dizziness and headache.       Objective   Physical Exam   Constitutional: She is oriented to person, place, and time. She appears well-developed and well-nourished.   HENT:   Head: Normocephalic and atraumatic.    Eyes: Pupils are equal, round, and reactive to light.   Cardiovascular: Normal rate, regular rhythm and normal heart sounds.   Pulmonary/Chest: Breath sounds normal.   Abdominal: Soft. Bowel sounds are normal. She exhibits no distension. There is no abdominal tenderness.   Neurological: She is alert and oriented to person, place, and time. She has normal reflexes. She displays normal reflexes. A sensory deficit is present.   Decreased in stocking distribution up to mid-calf b/l   Psychiatric: Her behavior is normal. Thought content normal.         Assessment & Plan   Diagnoses and all orders for this visit:    1. Leg pain, bilateral (Primary)    2. Greater trochanteric bursitis of both hips    3. Other long term (current) drug therapy    4. Peripheral polyneuropathy    5. Sacroiliac dysfunction    6. Pain in both feet        [facility] and Martin reviewed, in order. Low risk per SOAPP. Repeat UDS 8/12/22 in order.  Stopped Norco 10/325mg QID prn, rotated to Hysingla 40mg qdaily, worked but pharmacy won't carry, rotated to generic Hydrocodone ER 20mg BID, not strong enough, increased to 30mg BID. Restarted Hysingla 40mg qdaily. Cannot tolerate Morphine, Codeine, Oxycodone. May need to restart Norco 10mg QID prn if insurance changes.  Cont Gabapentin 1200mg TID, cannot tolerate Lyrica, written by PCP.  Cont RxAlt #2 cream, helps.  Cont Cymbalta.  Began Phenergan 25mg TID prn.  [facility] records unavailable.  RTC 3 months for f/u. Will send 3rd script in 2 months without an appt. Stable. Cannot fill on Sundays.

## 2023-06-13 DIAGNOSIS — M79.672 PAIN IN BOTH FEET: ICD-10-CM

## 2023-06-13 DIAGNOSIS — M79.671 PAIN IN BOTH FEET: ICD-10-CM

## 2023-06-13 DIAGNOSIS — M79.605 LEG PAIN, BILATERAL: ICD-10-CM

## 2023-06-13 DIAGNOSIS — M79.604 LEG PAIN, BILATERAL: ICD-10-CM

## 2023-06-13 RX ORDER — HYDROCODONE BITARTRATE 40 MG/1
1 TABLET, EXTENDED RELEASE ORAL DAILY
Qty: 30 EACH | Refills: 0 | Status: SHIPPED | OUTPATIENT
Start: 2023-06-13

## 2023-06-13 RX ORDER — HYDROCODONE BITARTRATE 40 MG/1
1 TABLET, EXTENDED RELEASE ORAL DAILY
Qty: 30 EACH | Refills: 0 | Status: CANCELLED | OUTPATIENT
Start: 2023-06-13

## 2023-06-13 NOTE — TELEPHONE ENCOUNTER
Caller: Angella Srivastava    Relationship: Self    Best call back number: [phone]    Requested Prescriptions:   Requested Prescriptions     Pending Prescriptions Disp Refills    HYDROcodone Bitartrate ER (Hysingla ER) 40 MG tablet extended-release 24 hour  30 each 0     Sig: Take 1 tablet by mouth Daily.        Pharmacy where request should be sent: [facility] [address] [phone] [facility] [phone] FX     Last office visit with prescribing clinician: 4/11/2023   Last telemedicine visit with prescribing clinician: Visit date not found   Next office visit with prescribing clinician: 7/11/2023         Does the patient have less than a 3 day supply:  [] Yes  [x] No      Mahogany Ballesteros   06/13/23 14:05 EDT

## 2023-07-12 ENCOUNTER — TELEPHONE (OUTPATIENT)
Dept: PAIN MEDICINE | Facility: CLINIC | Age: 68
End: 2023-07-12
Payer: COMMERCIAL

## 2023-07-12 DIAGNOSIS — M79.671 PAIN IN BOTH FEET: ICD-10-CM

## 2023-07-12 DIAGNOSIS — M79.604 LEG PAIN, BILATERAL: ICD-10-CM

## 2023-07-12 DIAGNOSIS — M79.672 PAIN IN BOTH FEET: ICD-10-CM

## 2023-07-12 DIAGNOSIS — M79.605 LEG PAIN, BILATERAL: ICD-10-CM

## 2023-07-12 RX ORDER — HYDROCODONE BITARTRATE 40 MG/1
1 TABLET, EXTENDED RELEASE ORAL DAILY
Qty: 30 EACH | Refills: 0 | Status: SHIPPED | OUTPATIENT
Start: 2023-07-12 | End: 2023-07-13 | Stop reason: SDUPTHER

## 2023-07-12 NOTE — TELEPHONE ENCOUNTER
Rx Hysingla  ER  not in stock at her pharmacy.    [facility] in [place] has some in their warehouse.  Could you resend Rx?

## 2023-08-17 DIAGNOSIS — M79.672 PAIN IN BOTH FEET: ICD-10-CM

## 2023-08-17 DIAGNOSIS — M79.604 LEG PAIN, BILATERAL: ICD-10-CM

## 2023-08-17 DIAGNOSIS — M79.671 PAIN IN BOTH FEET: ICD-10-CM

## 2023-08-17 DIAGNOSIS — M79.605 LEG PAIN, BILATERAL: ICD-10-CM

## 2023-08-17 NOTE — TELEPHONE ENCOUNTER
Patient at the pharmacy and there telling her they need a new rx on her for hysingla. I did call the pharmacy and I'm getting different answers she told me they were out and hesitated. So I'm not sure what the truth is.

## 2023-08-18 RX ORDER — HYDROCODONE BITARTRATE 40 MG/1
1 TABLET, EXTENDED RELEASE ORAL DAILY
Qty: 30 EACH | Refills: 0 | Status: SHIPPED | OUTPATIENT
Start: 2023-08-18

## 2023-08-21 DIAGNOSIS — M79.605 LEG PAIN, BILATERAL: ICD-10-CM

## 2023-08-21 DIAGNOSIS — M79.672 PAIN IN BOTH FEET: ICD-10-CM

## 2023-08-21 DIAGNOSIS — M79.671 PAIN IN BOTH FEET: ICD-10-CM

## 2023-08-21 DIAGNOSIS — M79.604 LEG PAIN, BILATERAL: ICD-10-CM

## 2023-08-21 RX ORDER — HYDROCODONE BITARTRATE 40 MG/1
1 TABLET, EXTENDED RELEASE ORAL DAILY
Qty: 30 EACH | Refills: 0 | Status: SHIPPED | OUTPATIENT
Start: 2023-08-21

## 2023-09-12 DIAGNOSIS — M79.671 PAIN IN BOTH FEET: ICD-10-CM

## 2023-09-12 DIAGNOSIS — M79.605 LEG PAIN, BILATERAL: ICD-10-CM

## 2023-09-12 DIAGNOSIS — M79.672 PAIN IN BOTH FEET: ICD-10-CM

## 2023-09-12 DIAGNOSIS — M79.604 LEG PAIN, BILATERAL: ICD-10-CM

## 2023-09-12 RX ORDER — HYDROCODONE BITARTRATE 40 MG/1
1 TABLET, EXTENDED RELEASE ORAL DAILY
Qty: 30 EACH | Refills: 0 | Status: SHIPPED | OUTPATIENT
Start: 2023-09-12

## 2023-10-06 ENCOUNTER — OFFICE VISIT (OUTPATIENT)
Dept: PAIN MEDICINE | Facility: CLINIC | Age: 68
End: 2023-10-06
Payer: COMMERCIAL

## 2023-10-06 VITALS
DIASTOLIC BLOOD PRESSURE: 93 MMHG | OXYGEN SATURATION: 98 % | SYSTOLIC BLOOD PRESSURE: 160 MMHG | HEART RATE: 85 BPM | RESPIRATION RATE: 16 BRPM

## 2023-10-06 DIAGNOSIS — M79.671 PAIN IN BOTH FEET: ICD-10-CM

## 2023-10-06 DIAGNOSIS — M53.3 SACROILIAC DYSFUNCTION: ICD-10-CM

## 2023-10-06 DIAGNOSIS — M79.604 LEG PAIN, BILATERAL: Primary | ICD-10-CM

## 2023-10-06 DIAGNOSIS — M79.605 LEG PAIN, BILATERAL: Primary | ICD-10-CM

## 2023-10-06 DIAGNOSIS — M79.672 PAIN IN BOTH FEET: ICD-10-CM

## 2023-10-06 DIAGNOSIS — G62.9 PERIPHERAL POLYNEUROPATHY: ICD-10-CM

## 2023-10-06 DIAGNOSIS — M70.61 GREATER TROCHANTERIC BURSITIS OF BOTH HIPS: ICD-10-CM

## 2023-10-06 DIAGNOSIS — Z79.899 OTHER LONG TERM (CURRENT) DRUG THERAPY: ICD-10-CM

## 2023-10-06 DIAGNOSIS — M70.62 GREATER TROCHANTERIC BURSITIS OF BOTH HIPS: ICD-10-CM

## 2023-10-06 DIAGNOSIS — R39.89 BLADDER PAIN: ICD-10-CM

## 2023-10-06 RX ORDER — GABAPENTIN 600 MG/1
1200 TABLET ORAL 3 TIMES DAILY
Qty: 540 TABLET | Refills: 1 | Status: SHIPPED | OUTPATIENT
Start: 2023-10-06

## 2023-10-06 RX ORDER — HYDROCODONE BITARTRATE 40 MG/1
1 TABLET, EXTENDED RELEASE ORAL DAILY
Qty: 30 EACH | Refills: 0 | Status: SHIPPED | OUTPATIENT
Start: 2023-10-06

## 2023-10-06 NOTE — PROGRESS NOTES
Subjective   Angella Srivastava is a 68 y.o. female.     History of Present Illness  Bilateral feet pain, also posterior leg pain, LLE > RLE. Dz with peripheral neuropathy in 2005, 10/10 at worst, 5/10 at best, burning, cold, pins & needles, always present, varies, worst in morning and at night, interferes with all activities that involve walking. Had NCS with PN. Started on Darvocet in past, taking Norco 10/325mg about QID prn with benefit at initial visit. Lyrica helped but caused severe cramping, taking Gabapentin 600mg QID at initial visit, also Cymbalta. Referred for pain management. Continued Norco, Gabapentin at 1200mg TID. Norco not lasting 6 hours, started Zohydro 20mg BID, then 30mg BID, can't get anymore, restarted Norco 10mg QID prn. Started CBD oil, helps thumb pain. Worsening nausea. Did better with Hysingla 40mg qdaily. Having occasional muscle spasms at night, keeping her awake.   Leg Pain   Associated symptoms include numbness.      The following portions of the patient's history were reviewed and updated as appropriate: allergies, current medications, past family history, past medical history, past social history, past surgical history and problem list.    Review of Systems   Constitutional:  Negative for chills, fatigue and fever.   HENT:  Negative for hearing loss and trouble swallowing.    Eyes:  Negative for visual disturbance.   Respiratory:  Negative for shortness of breath.    Cardiovascular:  Negative for chest pain.   Gastrointestinal:  Positive for nausea. Negative for abdominal pain, constipation, diarrhea and vomiting.   Genitourinary:  Negative for urinary incontinence.   Musculoskeletal:  Positive for arthralgias and back pain. Negative for joint swelling, myalgias and neck pain.   Neurological:  Positive for weakness and numbness. Negative for dizziness and headache.     Objective   Physical Exam   Constitutional: She is oriented to person, place, and time. She appears well-developed  and well-nourished.   HENT:   Head: Normocephalic and atraumatic.   Eyes: Pupils are equal, round, and reactive to light.   Cardiovascular: Normal rate, regular rhythm and normal heart sounds.   Pulmonary/Chest: Breath sounds normal.   Abdominal: Soft. Bowel sounds are normal. She exhibits no distension. There is no abdominal tenderness.   Neurological: She is alert and oriented to person, place, and time. She has normal reflexes. She displays normal reflexes. A sensory deficit is present.   Decreased in stocking distribution up to mid-calf b/l   Psychiatric: Her behavior is normal. Thought content normal.       Assessment & Plan   Diagnoses and all orders for this visit:    1. Leg pain, bilateral (Primary)    2. Greater trochanteric bursitis of both hips    3. Bladder pain    4. Other long term (current) drug therapy    5. Pain in both feet    6. Peripheral polyneuropathy    7. Sacroiliac dysfunction        INspect and Martin reviewed, in order. Low risk per SOAPP. Repeat UDS 8/12/22 in order.  Stopped Norco 10/325mg QID prn, rotated to Hysingla 40mg qdaily, worked but pharmacy won't carry, rotated to generic Hydrocodone ER 20mg BID, not strong enough, increased to 30mg BID. Restarted Hysingla 40mg qdaily. Cannot tolerate Morphine, Codeine, Oxycodone. May need to restart Norco 10mg QID prn if insurance changes. Filled 9/20/23.   Cont Gabapentin 1200mg TID, cannot tolerate Lyrica, written by PCP.  Began Tizanidine 4mg qHS prn.  Cont RxAlt #2 cream, helps.  Cont Cymbalta.  Began Phenergan 25mg TID prn.  Begin Licart qdaily prn to right lower back, failed OTC Ibuprofen and Naproxen.   [facility] records unavailable.  RTC 3 months for f/u. Will send 3rd script in 2 months without an appt. Stable. Cannot fill on Sundays.

## 2023-11-02 ENCOUNTER — TELEPHONE (OUTPATIENT)
Dept: PAIN MEDICINE | Facility: CLINIC | Age: 68
End: 2023-11-02
Payer: COMMERCIAL

## 2023-11-02 NOTE — TELEPHONE ENCOUNTER
Caller: PATIENT    Relationship to patient: SELF     Best call back number: [phone]    Patient is needing: PATIENT RECEIVED SOME SAMPLES FROM DR. COURTNEY FOR LIDOCAINE PATCHES (ZTLIDO) 1.8%. PATIENT WANTED TO REQUEST A PRESCRIPTION FOR THIS MEDICATION. PATIENT WOULD LIKE THIS TO BE SENT TO THE [facility] IN [place], [place] AND THEIR PHONE NUMBER [phone]. THANK YOU!

## 2023-12-11 ENCOUNTER — TELEPHONE (OUTPATIENT)
Dept: PAIN MEDICINE | Facility: CLINIC | Age: 68
End: 2023-12-11
Payer: COMMERCIAL

## 2023-12-11 DIAGNOSIS — M79.605 LEG PAIN, BILATERAL: ICD-10-CM

## 2023-12-11 DIAGNOSIS — M79.671 PAIN IN BOTH FEET: ICD-10-CM

## 2023-12-11 DIAGNOSIS — M79.672 PAIN IN BOTH FEET: ICD-10-CM

## 2023-12-11 DIAGNOSIS — M79.604 LEG PAIN, BILATERAL: ICD-10-CM

## 2023-12-11 NOTE — TELEPHONE ENCOUNTER
Caller: PATIENT    Relationship: SELF    Best call back number: [phone]    Requested Prescriptions     Pending Prescriptions Disp Refills    HYDROcodone Bitartrate ER (Hysingla ER) 40 MG tablet extended-release 24 hour  30 each 0     Sig: Take 1 tablet by mouth Daily.        Pharmacy where request should be sent:  [facility] IN [place], [place]    Last office visit with prescribing clinician: 10/6/2023   Last telemedicine visit with prescribing clinician: Visit date not found   Next office visit with prescribing clinician: 1/5/2024     Additional details provided by patient: PATIENT WAS CALLING TO REQUEST A REFILL ON HER MEDICATION. PATIENT STATED SHE ABOUT A 10 DAY SUPPLY LEFT. THANK YOU!    Does the patient have less than a 3 day supply:  [] Yes  [x] No    Would you like a call back once the refill request has been completed: [] Yes [x] No    If the office needs to give you a call back, can they leave a voicemail: [] Yes [x] No

## 2023-12-12 RX ORDER — HYDROCODONE BITARTRATE 40 MG/1
1 TABLET, EXTENDED RELEASE ORAL DAILY
Qty: 30 EACH | Refills: 0 | Status: SHIPPED | OUTPATIENT
Start: 2023-12-12

## 2024-01-05 ENCOUNTER — OFFICE VISIT (OUTPATIENT)
Dept: PAIN MEDICINE | Facility: CLINIC | Age: 69
End: 2024-01-05
Payer: COMMERCIAL

## 2024-01-05 VITALS
BODY MASS INDEX: 25.22 KG/M2 | HEART RATE: 76 BPM | SYSTOLIC BLOOD PRESSURE: 141 MMHG | OXYGEN SATURATION: 97 % | WEIGHT: 170.8 LBS | RESPIRATION RATE: 16 BRPM | DIASTOLIC BLOOD PRESSURE: 94 MMHG

## 2024-01-05 DIAGNOSIS — M70.62 GREATER TROCHANTERIC BURSITIS OF BOTH HIPS: ICD-10-CM

## 2024-01-05 DIAGNOSIS — M79.605 LEG PAIN, BILATERAL: ICD-10-CM

## 2024-01-05 DIAGNOSIS — G62.9 PERIPHERAL POLYNEUROPATHY: ICD-10-CM

## 2024-01-05 DIAGNOSIS — M79.604 LEG PAIN, BILATERAL: ICD-10-CM

## 2024-01-05 DIAGNOSIS — M70.61 GREATER TROCHANTERIC BURSITIS OF BOTH HIPS: ICD-10-CM

## 2024-01-05 DIAGNOSIS — M79.672 PAIN IN BOTH FEET: ICD-10-CM

## 2024-01-05 DIAGNOSIS — M79.671 PAIN IN BOTH FEET: ICD-10-CM

## 2024-01-05 DIAGNOSIS — Z79.899 OTHER LONG TERM (CURRENT) DRUG THERAPY: ICD-10-CM

## 2024-01-05 DIAGNOSIS — M53.3 SACROILIAC DYSFUNCTION: Primary | ICD-10-CM

## 2024-01-05 RX ORDER — GABAPENTIN 600 MG/1
1200 TABLET ORAL 3 TIMES DAILY
Qty: 540 TABLET | Refills: 1 | Status: SHIPPED | OUTPATIENT
Start: 2024-01-05

## 2024-01-05 RX ORDER — HYDROCODONE BITARTRATE 40 MG/1
1 TABLET, EXTENDED RELEASE ORAL DAILY
Qty: 30 EACH | Refills: 0 | Status: SHIPPED | OUTPATIENT
Start: 2024-01-05

## 2024-01-05 NOTE — PROGRESS NOTES
Subjective   Angella Srivastava is a 68 y.o. female.     History of Present Illness  Bilateral feet pain, also posterior leg pain, LLE > RLE. Dz with peripheral neuropathy in 2005, 10/10 at worst, 5/10 at best, burning, cold, pins & needles, always present, varies, worst in morning and at night, interferes with all activities that involve walking. Had NCS with PN. Started on Darvocet in past, taking Norco 10/325mg about QID prn with benefit at initial visit. Lyrica helped but caused severe cramping, taking Gabapentin 600mg QID at initial visit, also Cymbalta. Referred for pain management. Continued Norco, Gabapentin at 1200mg TID. Norco not lasting 6 hours, started Zohydro 20mg BID, then 30mg BID, can't get anymore, restarted Norco 10mg QID prn. Started CBD oil, helps thumb pain. Worsening nausea. Did better with Hysingla 40mg qdaily. Having occasional muscle spasms at night, keeping her awake.   Leg Pain   Associated symptoms include numbness.        The following portions of the patient's history were reviewed and updated as appropriate: allergies, current medications, past family history, past medical history, past social history, past surgical history and problem list.    Review of Systems   Constitutional:  Negative for chills, fatigue and fever.   HENT:  Negative for hearing loss and trouble swallowing.    Eyes:  Negative for visual disturbance.   Respiratory:  Negative for shortness of breath.    Cardiovascular:  Negative for chest pain.   Gastrointestinal:  Positive for nausea. Negative for abdominal pain, constipation, diarrhea and vomiting.   Genitourinary:  Negative for urinary incontinence.   Musculoskeletal:  Positive for arthralgias and back pain. Negative for joint swelling, myalgias and neck pain.   Neurological:  Positive for weakness and numbness. Negative for dizziness and headache.       Objective   Physical Exam   Constitutional: She is oriented to person, place, and time. She appears  well-developed and well-nourished.   HENT:   Head: Normocephalic and atraumatic.   Eyes: Pupils are equal, round, and reactive to light.   Cardiovascular: Normal rate, regular rhythm and normal heart sounds.   Pulmonary/Chest: Breath sounds normal.   Abdominal: Soft. Bowel sounds are normal. She exhibits no distension. There is no abdominal tenderness.   Neurological: She is alert and oriented to person, place, and time. She has normal reflexes. She displays normal reflexes. A sensory deficit is present.   Decreased in stocking distribution up to mid-calf b/l   Psychiatric: Her behavior is normal. Thought content normal.         Assessment & Plan   Diagnoses and all orders for this visit:    1. Sacroiliac dysfunction (Primary)    2. Greater trochanteric bursitis of both hips    3. Leg pain, bilateral    4. Other long term (current) drug therapy    5. Pain in both feet    6. Peripheral polyneuropathy        INspect and Martin reviewed, in order. Low risk per SOAPP. Repeat /23 in order.  Stopped Norco 10/325mg QID prn, rotated to Hysingla 40mg qdaily, worked but pharmacy won't carry, rotated to generic Hydrocodone ER 20mg BID, not strong enough, increased to 30mg BID. Restarted Hysingla 40mg qdaily. Cannot tolerate Morphine, Codeine, Oxycodone. May need to restart Norco 10mg QID prn if insurance changes. Filled 12/20/23.   Cont Gabapentin 1200mg TID, cannot tolerate Lyrica, written by PCP.  Began Tizanidine 4mg qHS prn.  Cont RxAlt #2 cream, helps.  Cont Cymbalta.  Began Phenergan 25mg TID prn.  Began Licart qdaily prn to right lower back, failed OTC Ibuprofen and Naproxen.   [facility] records unavailable.  RTC 3 months for f/u. Will send 3rd script in 2 months without an appt. Stable. Cannot fill on Sundays.

## 2024-02-16 DIAGNOSIS — M79.604 LEG PAIN, BILATERAL: ICD-10-CM

## 2024-02-16 DIAGNOSIS — M79.605 LEG PAIN, BILATERAL: ICD-10-CM

## 2024-02-16 DIAGNOSIS — M79.671 PAIN IN BOTH FEET: ICD-10-CM

## 2024-02-16 DIAGNOSIS — M79.672 PAIN IN BOTH FEET: ICD-10-CM

## 2024-02-16 NOTE — TELEPHONE ENCOUNTER
Caller: Angella Srivastava    Relationship: Self    Best call back number: [phone]    Requested Prescriptions:   Requested Prescriptions     Pending Prescriptions Disp Refills    HYDROcodone Bitartrate ER (Hysingla ER) 40 MG tablet extended-release 24 hour  30 each 0     Sig: Take 1 tablet by mouth Daily.        Pharmacy where request should be sent:  [facility]     Last office visit with prescribing clinician: 1/5/2024   Last telemedicine visit with prescribing clinician: Visit date not found   Next office visit with prescribing clinician: 4/5/2024       Mahogany Avalos Rep   02/16/24 09:57 EST        PATIENT GOT THE MEDICATION 01/19/2024 AND NOW THE PAHARM[facility]Y IS STATING IT CANNOT BE REFILLED UNTIL 02/20/2024 MAKING THE PATIENT 2 PILLS SHORT BEFORE THE REFILL

## 2024-02-19 RX ORDER — HYDROCODONE BITARTRATE 40 MG/1
1 TABLET, EXTENDED RELEASE ORAL DAILY
Qty: 30 EACH | Refills: 0 | Status: SHIPPED | OUTPATIENT
Start: 2024-02-19

## 2024-03-12 ENCOUNTER — TELEPHONE (OUTPATIENT)
Dept: PAIN MEDICINE | Facility: CLINIC | Age: 69
End: 2024-03-12
Payer: COMMERCIAL

## 2024-03-12 DIAGNOSIS — M79.604 LEG PAIN, BILATERAL: ICD-10-CM

## 2024-03-12 DIAGNOSIS — M79.605 LEG PAIN, BILATERAL: ICD-10-CM

## 2024-03-12 RX ORDER — HYDROCODONE BITARTRATE 40 MG/1
1 TABLET, EXTENDED RELEASE ORAL DAILY
Qty: 30 EACH | Refills: 0 | Status: SHIPPED | OUTPATIENT
Start: 2024-03-12

## 2024-03-12 NOTE — TELEPHONE ENCOUNTER
Caller: Angella Srivastava    Relationship: Self    Best call back number: [phone]    Requested Prescriptions: HYDROCODONE BITARTRATE ER 40 MG   Requested Prescriptions      No prescriptions requested or ordered in this encounter        Pharmacy where request should be sent:  [facility]     Last office visit with prescribing clinician: 1/5/2024   Last telemedicine visit with prescribing clinician: Visit date not found   Next office visit with prescribing clinician: 4/5/2024     Additional details provided by patient: PATIENT STATES DALJIT LAST PILL WILL BE TAKEN ON THE 20TH BUT SHE WAS INSTRUCTED TO CALL 10 DAYS AHEAD OF TIME FOR A REFILL. PATIENT ALSO STATES SHE WAS PRESSING OPTION 4 TO GET THROUGH BUT IT WAS SAYING THAT OPTION WAS NOT RECOGNIZED.     Does the patient have less than a 3 day supply:  [] Yes  [x] No    Would you like a call back once the refill request has been completed: [] Yes [x] No    If the office needs to give you a call back, can they leave a voicemail: [x] Yes [] No    Mahogany Victoria Rep   03/12/24 15:36 EDT

## 2024-04-05 ENCOUNTER — OFFICE VISIT (OUTPATIENT)
Dept: PAIN MEDICINE | Facility: CLINIC | Age: 69
End: 2024-04-05
Payer: COMMERCIAL

## 2024-04-05 VITALS
OXYGEN SATURATION: 97 % | HEART RATE: 77 BPM | BODY MASS INDEX: 25.92 KG/M2 | SYSTOLIC BLOOD PRESSURE: 129 MMHG | WEIGHT: 175.5 LBS | RESPIRATION RATE: 16 BRPM | DIASTOLIC BLOOD PRESSURE: 81 MMHG

## 2024-04-05 DIAGNOSIS — M53.3 SACROILIAC DYSFUNCTION: ICD-10-CM

## 2024-04-05 DIAGNOSIS — G62.9 PERIPHERAL POLYNEUROPATHY: ICD-10-CM

## 2024-04-05 DIAGNOSIS — Z79.899 OTHER LONG TERM (CURRENT) DRUG THERAPY: ICD-10-CM

## 2024-04-05 DIAGNOSIS — M70.62 GREATER TROCHANTERIC BURSITIS OF BOTH HIPS: ICD-10-CM

## 2024-04-05 DIAGNOSIS — M79.672 PAIN IN BOTH FEET: ICD-10-CM

## 2024-04-05 DIAGNOSIS — M70.61 GREATER TROCHANTERIC BURSITIS OF BOTH HIPS: ICD-10-CM

## 2024-04-05 DIAGNOSIS — R39.89 BLADDER PAIN: ICD-10-CM

## 2024-04-05 DIAGNOSIS — M79.671 PAIN IN BOTH FEET: ICD-10-CM

## 2024-04-05 DIAGNOSIS — M79.605 LEG PAIN, BILATERAL: Primary | ICD-10-CM

## 2024-04-05 DIAGNOSIS — M79.604 LEG PAIN, BILATERAL: Primary | ICD-10-CM

## 2024-04-05 RX ORDER — METHYLPREDNISOLONE 4 MG/1
TABLET ORAL
Qty: 21 TABLET | Refills: 0 | Status: SHIPPED | OUTPATIENT
Start: 2024-04-05

## 2024-04-05 RX ORDER — CLONIDINE HYDROCHLORIDE 0.2 MG/1
0.2 TABLET ORAL 2 TIMES DAILY
Qty: 10 TABLET | Refills: 0 | Status: SHIPPED | OUTPATIENT
Start: 2024-04-05

## 2024-04-05 RX ORDER — HYDROCODONE BITARTRATE 40 MG/1
1 TABLET, EXTENDED RELEASE ORAL DAILY
Qty: 30 EACH | Refills: 0 | Status: SHIPPED | OUTPATIENT
Start: 2024-04-05

## 2024-04-05 RX ORDER — GABAPENTIN 600 MG/1
1200 TABLET ORAL 3 TIMES DAILY
Qty: 540 TABLET | Refills: 1 | Status: SHIPPED | OUTPATIENT
Start: 2024-04-05

## 2024-04-05 NOTE — PROGRESS NOTES
Subjective   Angella Srivastava is a 68 y.o. female.     History of Present Illness  Bilateral feet pain, also posterior leg pain, LLE > RLE. Dz with peripheral neuropathy in 2005, 10/10 at worst, 5/10 at best, burning, cold, pins & needles, always present, varies, worst in morning and at night, interferes with all activities that involve walking. Had NCS with PN. Started on Darvocet in past, taking Norco 10/325mg about QID prn with benefit at initial visit. Lyrica helped but caused severe cramping, taking Gabapentin 600mg QID at initial visit, also Cymbalta. Referred for pain management. Continued Norco, Gabapentin at 1200mg TID. Norco not lasting 6 hours, started Zohydro 20mg BID, then 30mg BID, can't get anymore, restarted Norco 10mg QID prn. Started CBD oil, helps thumb pain. Worsening nausea. Did better with Hysingla 40mg qdaily. Having occasional muscle spasms at night, keeping her awake. New acute LBP.   Back Pain  Associated symptoms include leg pain, numbness and weakness. Pertinent negatives include no abdominal pain, bladder incontinence, chest pain or fever.   Leg Pain   Associated symptoms include numbness.        The following portions of the patient's history were reviewed and updated as appropriate: allergies, current medications, past family history, past medical history, past social history, past surgical history and problem list.    Review of Systems   Constitutional:  Negative for chills, fatigue and fever.   HENT:  Negative for hearing loss and trouble swallowing.    Eyes:  Negative for visual disturbance.   Respiratory:  Negative for shortness of breath.    Cardiovascular:  Negative for chest pain.   Gastrointestinal:  Positive for nausea. Negative for abdominal pain, constipation, diarrhea and vomiting.   Genitourinary:  Negative for urinary incontinence.   Musculoskeletal:  Positive for arthralgias and back pain. Negative for joint swelling, myalgias and neck pain.   Neurological:  Positive for  weakness and numbness. Negative for dizziness and headache.       Objective   Physical Exam   Constitutional: She is oriented to person, place, and time. She appears well-developed and well-nourished.   HENT:   Head: Normocephalic and atraumatic.   Eyes: Pupils are equal, round, and reactive to light.   Cardiovascular: Normal rate, regular rhythm and normal heart sounds.   Pulmonary/Chest: Breath sounds normal.   Abdominal: Soft. Bowel sounds are normal. She exhibits no distension. There is no abdominal tenderness.   Neurological: She is alert and oriented to person, place, and time. She has normal reflexes. She displays normal reflexes. A sensory deficit is present.   Decreased in stocking distribution up to mid-calf b/l   Psychiatric: Her behavior is normal. Thought content normal.         Assessment & Plan   Diagnoses and all orders for this visit:    1. Leg pain, bilateral (Primary)    2. Greater trochanteric bursitis of both hips    3. Bladder pain    4. Other long term (current) drug therapy    5. Pain in both feet    6. Peripheral polyneuropathy    7. Sacroiliac dysfunction        INspect and Martin reviewed, in order. Low risk per SOAPP. Repeat UDS 7/11/23 in order.  Stopped Norco 10/325mg QID prn, rotated to Hysingla 40mg qdaily, worked but pharmacy won't carry, rotated to generic Hydrocodone ER 20mg BID, not strong enough, increased to 30mg BID. Restarted Hysingla 40mg qdaily. Cannot tolerate Morphine, Codeine, Oxycodone. May need to restart Norco 10mg QID prn if insurance changes. Filled 3/20/24.   Cont Gabapentin 1200mg TID, cannot tolerate Lyrica, written by PCP.  Began Tizanidine 4mg qHS prn.  Begin Medrol Dosepak.  Order Catapres 0.2mg BID x 5 days in case she is out of her medication per her request.  Cont RxAlt #2 cream, helps.  Cont Cymbalta.  Began Phenergan 25mg TID prn.  Began Licart qdaily prn to right lower back, failed OTC Ibuprofen and Naproxen.   [facility] records unavailable.  RTC 3 months for  f/u. Will send 3rd script in 2 months without an appt. Stable. Cannot fill on Sundays.                    Physical Exam  Constitutional:       Appearance: She is well-developed and well-nourished.   HENT:      Head: Normocephalic and atraumatic.   Eyes:      Pupils: Pupils are equal, round, and reactive to light.   Cardiovascular:      Rate and Rhythm: Normal rate and regular rhythm.      Heart sounds: Normal heart sounds.   Pulmonary:      Breath sounds: Normal breath sounds.   Abdominal:      General: Bowel sounds are normal. There is no distension.      Palpations: Abdomen is soft.      Tenderness: There is no abdominal tenderness.   Neurological:      Mental Status: She is alert and oriented to person, place, and time.      Sensory: Sensory deficit present.      Deep Tendon Reflexes: Reflexes are normal and symmetric. Reflexes normal.      Comments: Decreased in stocking distribution up to mid-calf b/l   Psychiatric:         Behavior: Behavior normal.         Thought Content: Thought content normal.

## 2024-06-11 DIAGNOSIS — M79.604 LEG PAIN, BILATERAL: ICD-10-CM

## 2024-06-11 DIAGNOSIS — M79.605 LEG PAIN, BILATERAL: ICD-10-CM

## 2024-06-11 RX ORDER — HYDROCODONE BITARTRATE 40 MG/1
1 TABLET, EXTENDED RELEASE ORAL DAILY
Qty: 30 EACH | Refills: 0 | Status: SHIPPED | OUTPATIENT
Start: 2024-06-19

## 2024-07-09 ENCOUNTER — OFFICE VISIT (OUTPATIENT)
Dept: PAIN MEDICINE | Facility: CLINIC | Age: 69
End: 2024-07-09
Payer: COMMERCIAL

## 2024-07-09 VITALS
HEART RATE: 83 BPM | BODY MASS INDEX: 25.96 KG/M2 | SYSTOLIC BLOOD PRESSURE: 148 MMHG | DIASTOLIC BLOOD PRESSURE: 82 MMHG | WEIGHT: 175.8 LBS | RESPIRATION RATE: 16 BRPM | OXYGEN SATURATION: 96 %

## 2024-07-09 DIAGNOSIS — M79.604 LEG PAIN, BILATERAL: Primary | ICD-10-CM

## 2024-07-09 DIAGNOSIS — G62.9 PERIPHERAL POLYNEUROPATHY: ICD-10-CM

## 2024-07-09 DIAGNOSIS — R39.89 BLADDER PAIN: ICD-10-CM

## 2024-07-09 DIAGNOSIS — Z79.899 HIGH RISK MEDICATION USE: Primary | ICD-10-CM

## 2024-07-09 DIAGNOSIS — Z79.899 OTHER LONG TERM (CURRENT) DRUG THERAPY: ICD-10-CM

## 2024-07-09 DIAGNOSIS — M70.62 GREATER TROCHANTERIC BURSITIS OF BOTH HIPS: ICD-10-CM

## 2024-07-09 DIAGNOSIS — M79.605 LEG PAIN, BILATERAL: Primary | ICD-10-CM

## 2024-07-09 DIAGNOSIS — M79.672 PAIN IN BOTH FEET: ICD-10-CM

## 2024-07-09 DIAGNOSIS — M70.61 GREATER TROCHANTERIC BURSITIS OF BOTH HIPS: ICD-10-CM

## 2024-07-09 DIAGNOSIS — M79.671 PAIN IN BOTH FEET: ICD-10-CM

## 2024-07-09 DIAGNOSIS — M53.3 SACROILIAC DYSFUNCTION: ICD-10-CM

## 2024-07-09 PROCEDURE — 99214 OFFICE O/P EST MOD 30 MIN: CPT | Performed by: PHYSICAL MEDICINE & REHABILITATION

## 2024-07-09 RX ORDER — TIZANIDINE 4 MG/1
4 TABLET ORAL NIGHTLY PRN
Qty: 90 TABLET | Refills: 3 | Status: SHIPPED | OUTPATIENT
Start: 2024-07-09

## 2024-07-09 RX ORDER — HYDROCODONE BITARTRATE 40 MG/1
1 TABLET, EXTENDED RELEASE ORAL DAILY
Qty: 30 EACH | Refills: 0 | Status: SHIPPED | OUTPATIENT
Start: 2024-07-09

## 2024-07-09 RX ORDER — GABAPENTIN 600 MG/1
1200 TABLET ORAL 3 TIMES DAILY
Qty: 540 TABLET | Refills: 1 | Status: SHIPPED | OUTPATIENT
Start: 2024-07-09

## 2024-09-05 ENCOUNTER — TELEPHONE (OUTPATIENT)
Dept: PAIN MEDICINE | Facility: CLINIC | Age: 69
End: 2024-09-05
Payer: COMMERCIAL

## 2024-09-05 DIAGNOSIS — M79.605 LEG PAIN, BILATERAL: ICD-10-CM

## 2024-09-05 DIAGNOSIS — M79.672 PAIN IN BOTH FEET: ICD-10-CM

## 2024-09-05 DIAGNOSIS — M79.604 LEG PAIN, BILATERAL: ICD-10-CM

## 2024-09-05 DIAGNOSIS — M79.671 PAIN IN BOTH FEET: ICD-10-CM

## 2024-09-05 RX ORDER — HYDROCODONE BITARTRATE 40 MG/1
1 TABLET, EXTENDED RELEASE ORAL DAILY
Qty: 30 EACH | Refills: 0 | Status: CANCELLED | OUTPATIENT
Start: 2024-09-05

## 2024-09-06 RX ORDER — HYDROCODONE BITARTRATE 40 MG/1
1 TABLET, EXTENDED RELEASE ORAL DAILY
Qty: 30 EACH | Refills: 0 | Status: SHIPPED | OUTPATIENT
Start: 2024-09-06

## 2024-10-08 ENCOUNTER — OFFICE VISIT (OUTPATIENT)
Dept: PAIN MEDICINE | Facility: CLINIC | Age: 69
End: 2024-10-08
Payer: COMMERCIAL

## 2024-10-08 VITALS
BODY MASS INDEX: 25.64 KG/M2 | SYSTOLIC BLOOD PRESSURE: 139 MMHG | HEART RATE: 77 BPM | OXYGEN SATURATION: 95 % | RESPIRATION RATE: 16 BRPM | DIASTOLIC BLOOD PRESSURE: 90 MMHG | WEIGHT: 173.6 LBS

## 2024-10-08 DIAGNOSIS — G62.9 PERIPHERAL POLYNEUROPATHY: ICD-10-CM

## 2024-10-08 DIAGNOSIS — M79.672 PAIN IN BOTH FEET: ICD-10-CM

## 2024-10-08 DIAGNOSIS — Z79.899 OTHER LONG TERM (CURRENT) DRUG THERAPY: ICD-10-CM

## 2024-10-08 DIAGNOSIS — M79.605 LEG PAIN, BILATERAL: Primary | ICD-10-CM

## 2024-10-08 DIAGNOSIS — M70.62 GREATER TROCHANTERIC BURSITIS OF BOTH HIPS: ICD-10-CM

## 2024-10-08 DIAGNOSIS — M79.604 LEG PAIN, BILATERAL: Primary | ICD-10-CM

## 2024-10-08 DIAGNOSIS — M53.3 SACROILIAC DYSFUNCTION: ICD-10-CM

## 2024-10-08 DIAGNOSIS — M79.671 PAIN IN BOTH FEET: ICD-10-CM

## 2024-10-08 DIAGNOSIS — M70.61 GREATER TROCHANTERIC BURSITIS OF BOTH HIPS: ICD-10-CM

## 2024-10-08 DIAGNOSIS — R39.89 BLADDER PAIN: ICD-10-CM

## 2024-10-08 PROCEDURE — 99214 OFFICE O/P EST MOD 30 MIN: CPT | Performed by: PHYSICAL MEDICINE & REHABILITATION

## 2024-10-08 RX ORDER — HYDROCODONE BITARTRATE 40 MG/1
1 TABLET, EXTENDED RELEASE ORAL DAILY
Qty: 30 EACH | Refills: 0 | Status: SHIPPED | OUTPATIENT
Start: 2024-10-08

## 2024-10-08 NOTE — PROGRESS NOTES
Subjective   Angella Srivastava is a 69 y.o. female.     History of Present Illness  Bilateral feet pain, also posterior leg pain, LLE > RLE. Dx with peripheral neuropathy in 2005, 10/10 at worst, 5/10 at best, burning, cold, pins & needles, always present, varies, worst in morning and at night, interferes with all activities that involve walking. Had NCS with PN. Started on Darvocet in past, taking Norco 10/325mg about QID prn with benefit at initial visit. Lyrica helped but caused severe cramping, taking Gabapentin 600mg QID at initial visit, also Cymbalta. Referred for pain management. Continued Norco, Gabapentin at 1200mg TID. Norco not lasting 6 hours, started Zohydro 20mg BID, then 30mg BID, can't get anymore, restarted Norco 10mg QID prn. Started CBD oil, helps thumb pain. Worsening nausea. Did better with Hysingla 40mg qdaily. Having occasional muscle spasms at night, keeping her awake. New acute LBP.   Leg Pain   Associated symptoms include numbness.   Pain  Associated symptoms include arthralgias, nausea, numbness and weakness. Pertinent negatives include no abdominal pain, chest pain, chills, fatigue, fever, joint swelling, myalgias, neck pain or vomiting.   Back Pain  Associated symptoms include leg pain, numbness and weakness. Pertinent negatives include no abdominal pain, bladder incontinence, chest pain or fever.        The following portions of the patient's history were reviewed and updated as appropriate: allergies, current medications, past family history, past medical history, past social history, past surgical history and problem list.    Review of Systems   Constitutional:  Negative for chills, fatigue and fever.   HENT:  Negative for hearing loss and trouble swallowing.    Eyes:  Negative for visual disturbance.   Respiratory:  Negative for shortness of breath.    Cardiovascular:  Negative for chest pain.   Gastrointestinal:  Positive for nausea. Negative for abdominal pain, constipation,  diarrhea and vomiting.   Genitourinary:  Negative for urinary incontinence.   Musculoskeletal:  Positive for arthralgias and back pain. Negative for joint swelling, myalgias and neck pain.   Neurological:  Positive for weakness and numbness. Negative for dizziness and headache.       Objective   Physical Exam   Constitutional: She is oriented to person, place, and time. She appears well-developed and well-nourished.   HENT:   Head: Normocephalic and atraumatic.   Eyes: Pupils are equal, round, and reactive to light.   Cardiovascular: Normal rate, regular rhythm and normal heart sounds.   Pulmonary/Chest: Breath sounds normal.   Abdominal: Soft. Bowel sounds are normal. She exhibits no distension. There is no abdominal tenderness.   Neurological: She is alert and oriented to person, place, and time. She has normal reflexes. She displays normal reflexes. A sensory deficit is present.   Decreased in stocking distribution up to mid-calf b/l   Psychiatric: Her behavior is normal. Thought content normal.         Assessment & Plan   Diagnoses and all orders for this visit:    1. Leg pain, bilateral (Primary)    2. Bladder pain    3. Other long term (current) drug therapy    4. Greater trochanteric bursitis of both hips    5. Pain in both feet    6. Peripheral polyneuropathy    7. Sacroiliac dysfunction        INspect and Martin reviewed, in order. Low risk per SOAPP. Repeat UDS 7/9/24 in order.  Stopped Norco 10/325mg QID prn, rotated to Hysingla 40mg qdaily, worked but pharmacy won't carry, rotated to generic Hydrocodone ER 20mg BID, not strong enough, increased to 30mg BID. Restarted Hysingla 40mg qdaily. Cannot tolerate Morphine, Codeine, Oxycodone. May need to restart Norco 10mg QID prn if insurance changes. Filled 9/17/24. Feels she may do well with MS-contin, may need to try if Hysingla becomes unavailable.  Patient's pain is still well-managed by current medication regimen, is doing well at this strength and dosage,  therefore I will continue to prescribe unchanged as the most appropriate course of treatment.  Cont Gabapentin 1200mg TID, cannot tolerate Lyrica, written by PCP.  Began Tizanidine 4mg qHS prn.  Began Medrol Dosepak.  Order Catapres 0.2mg BID x 5 days in case she is out of her medication per her request.  Cont RxAlt #2 cream, helps.  Cont Cymbalta.  Began Phenergan 25mg TID prn.  Began Licart qdaily prn to right lower back, failed OTC Ibuprofen and Naproxen.   [facility] records unavailable.  RTC 3 months for f/u. Will send 3rd script in 2 months without an appt, others to [facility]. Stable. Cannot fill on Sundays.                    Physical Exam  Constitutional:       Appearance: She is well-developed and well-nourished.   HENT:      Head: Normocephalic and atraumatic.   Eyes:      Pupils: Pupils are equal, round, and reactive to light.   Cardiovascular:      Rate and Rhythm: Normal rate and regular rhythm.      Heart sounds: Normal heart sounds.   Pulmonary:      Breath sounds: Normal breath sounds.   Abdominal:      General: Bowel sounds are normal. There is no distension.      Palpations: Abdomen is soft.      Tenderness: There is no abdominal tenderness.   Neurological:      Mental Status: She is alert and oriented to person, place, and time.      Sensory: Sensory deficit present.      Deep Tendon Reflexes: Reflexes are normal and symmetric. Reflexes normal.      Comments: Decreased in stocking distribution up to mid-calf b/l   Psychiatric:         Behavior: Behavior normal.         Thought Content: Thought content normal.

## 2024-12-10 DIAGNOSIS — M79.605 LEG PAIN, BILATERAL: ICD-10-CM

## 2024-12-10 DIAGNOSIS — M79.604 LEG PAIN, BILATERAL: ICD-10-CM

## 2024-12-10 RX ORDER — HYDROCODONE BITARTRATE 40 MG/1
1 TABLET, EXTENDED RELEASE ORAL DAILY
Qty: 30 EACH | Refills: 0 | Status: SHIPPED | OUTPATIENT
Start: 2024-12-10

## 2025-01-07 ENCOUNTER — TELEPHONE (OUTPATIENT)
Dept: PAIN MEDICINE | Facility: CLINIC | Age: 70
End: 2025-01-07
Payer: COMMERCIAL

## 2025-01-07 NOTE — TELEPHONE ENCOUNTER
Caller: Angella Srivastava    Relationship to patient: Self    Best call back number: [phone]    Patient is needing: PT IS CALLING TO SEE IF SHE CAN CHANGE HER APPOINTMENT ON 01/10/25 TO A TELEHEALTH APPOINTMENT DUE TO HER BEING SICK AT THE MOMENT.. PLEASE ADVISE..

## 2025-01-14 ENCOUNTER — OFFICE VISIT (OUTPATIENT)
Dept: PAIN MEDICINE | Facility: CLINIC | Age: 70
End: 2025-01-14
Payer: COMMERCIAL

## 2025-01-14 VITALS
HEART RATE: 96 BPM | DIASTOLIC BLOOD PRESSURE: 91 MMHG | WEIGHT: 173 LBS | RESPIRATION RATE: 16 BRPM | OXYGEN SATURATION: 99 % | SYSTOLIC BLOOD PRESSURE: 138 MMHG | BODY MASS INDEX: 25.55 KG/M2

## 2025-01-14 DIAGNOSIS — M79.671 PAIN IN BOTH FEET: ICD-10-CM

## 2025-01-14 DIAGNOSIS — M70.62 GREATER TROCHANTERIC BURSITIS OF BOTH HIPS: ICD-10-CM

## 2025-01-14 DIAGNOSIS — M70.61 GREATER TROCHANTERIC BURSITIS OF BOTH HIPS: ICD-10-CM

## 2025-01-14 DIAGNOSIS — M53.3 SACROILIAC DYSFUNCTION: ICD-10-CM

## 2025-01-14 DIAGNOSIS — G62.9 PERIPHERAL POLYNEUROPATHY: ICD-10-CM

## 2025-01-14 DIAGNOSIS — M79.672 PAIN IN BOTH FEET: ICD-10-CM

## 2025-01-14 DIAGNOSIS — M79.604 LEG PAIN, BILATERAL: Primary | ICD-10-CM

## 2025-01-14 DIAGNOSIS — Z79.899 OTHER LONG TERM (CURRENT) DRUG THERAPY: ICD-10-CM

## 2025-01-14 DIAGNOSIS — M79.605 LEG PAIN, BILATERAL: Primary | ICD-10-CM

## 2025-01-14 RX ORDER — HYDROCODONE BITARTRATE 40 MG/1
1 TABLET, EXTENDED RELEASE ORAL DAILY
Qty: 30 EACH | Refills: 0 | Status: SHIPPED | OUTPATIENT
Start: 2025-01-14

## 2025-01-14 NOTE — PROGRESS NOTES
Subjective   Angella Srivastava is a 69 y.o. female.     History of Present Illness  Bilateral feet pain, also posterior leg pain, LLE > RLE. Dx with peripheral neuropathy in 2005, 10/10 at worst, 5/10 at best, burning, cold, pins & needles, always present, varies, worst in morning and at night, interferes with all activities that involve walking. Had NCS with PN. Started on Darvocet in past, taking Norco 10/325mg about QID prn with benefit at initial visit. Lyrica helped but caused severe cramping, taking Gabapentin 600mg QID at initial visit, also Cymbalta. Referred for pain management. Continued Norco, Gabapentin at 1200mg TID. Norco not lasting 6 hours, started Zohydro 20mg BID, then 30mg BID, can't get anymore, restarted Norco 10mg QID prn. Started CBD oil, helps thumb pain. Worsening nausea. Did better with Hysingla 40mg qdaily. Having occasional muscle spasms at night, keeping her awake. New acute LBP.   Leg Pain   Associated symptoms include numbness.   Pain  Associated symptoms include arthralgias, nausea, numbness and weakness. Pertinent negatives include no abdominal pain, chest pain, chills, fatigue, fever, joint swelling, myalgias, neck pain or vomiting.   Back Pain  Associated symptoms include leg pain, numbness and weakness. Pertinent negatives include no abdominal pain, bladder incontinence, chest pain or fever.        The following portions of the patient's history were reviewed and updated as appropriate: allergies, current medications, past family history, past medical history, past social history, past surgical history and problem list.    Review of Systems   Constitutional:  Negative for chills, fatigue and fever.   HENT:  Negative for hearing loss and trouble swallowing.    Eyes:  Negative for visual disturbance.   Respiratory:  Negative for shortness of breath.    Cardiovascular:  Negative for chest pain.   Gastrointestinal:  Positive for nausea. Negative for abdominal pain, constipation,  diarrhea and vomiting.   Genitourinary:  Negative for urinary incontinence.   Musculoskeletal:  Positive for arthralgias and back pain. Negative for joint swelling, myalgias and neck pain.   Neurological:  Positive for weakness and numbness. Negative for dizziness and headache.       Objective   Physical Exam   Constitutional: She is oriented to person, place, and time. She appears well-developed and well-nourished.   HENT:   Head: Normocephalic and atraumatic.   Eyes: Pupils are equal, round, and reactive to light.   Cardiovascular: Normal rate, regular rhythm and normal heart sounds.   Pulmonary/Chest: Breath sounds normal.   Abdominal: Soft. Bowel sounds are normal. She exhibits no distension. There is no abdominal tenderness.   Neurological: She is alert and oriented to person, place, and time. She has normal reflexes. She displays normal reflexes. A sensory deficit is present.   Decreased in stocking distribution up to mid-calf b/l   Psychiatric: Her behavior is normal. Thought content normal.         Assessment & Plan   Diagnoses and all orders for this visit:    1. Leg pain, bilateral (Primary)    2. Greater trochanteric bursitis of both hips    3. Other long term (current) drug therapy    4. Pain in both feet    5. Peripheral polyneuropathy    6. Sacroiliac dysfunction        [facility] and [facility] reviewed, in order. Low risk per SOAPP. Repeat UDS 7/9/24 in order.  Stopped Norco 10/325mg QID prn, rotated to Hysingla 40mg qdaily, worked but pharmacy won't carry, rotated to generic Hydrocodone ER 20mg BID, not strong enough, increased to 30mg BID. Restarted Hysingla 40mg qdaily. Cannot tolerate Morphine, Codeine, Oxycodone. May need to restart Norco 10mg QID prn if insurance changes. Filled 12/16/24. Feels she may do well with MS-contin, may need to try if Hysingla becomes unavailable.  Patient's pain is still well-managed by current medication regimen, is doing well at this strength and dosage, therefore I will  continue to prescribe unchanged as the most appropriate course of treatment.  Cont Gabapentin 1200mg TID, cannot tolerate Lyrica, written by PCP.  Began Tizanidine 4mg qHS prn.  Began Medrol Dosepak.  Order Catapres 0.2mg BID x 5 days in case she is out of her medication per her request.  Cont RxAlt #2 cream, helps.  Cont Cymbalta.  Began Phenergan 25mg TID prn.  Began Licart qdaily prn to right lower back, failed OTC Ibuprofen and Naproxen.   [facility] records unavailable. She will investigate having procedures performed at [facility] as they have a clinic in her town.   RTC 3 months for f/u. Will send 3rd script in 2 months without an appt, others to [facility]. Stable. Cannot fill on Sundays.

## 2025-03-11 DIAGNOSIS — M79.605 LEG PAIN, BILATERAL: ICD-10-CM

## 2025-03-11 DIAGNOSIS — M79.604 LEG PAIN, BILATERAL: ICD-10-CM

## 2025-03-11 RX ORDER — HYDROCODONE BITARTRATE 40 MG/1
1 TABLET, EXTENDED RELEASE ORAL DAILY
Qty: 30 EACH | Refills: 0 | Status: SHIPPED | OUTPATIENT
Start: 2025-03-11

## 2025-03-11 NOTE — TELEPHONE ENCOUNTER
Caller: Angella Srivastava    Relationship: Self    Best call back number: [phone] (home)     Requested Prescriptions:   Requested Prescriptions     Pending Prescriptions Disp Refills    HYDROcodone Bitartrate ER (Hysingla ER) 40 MG tablet extended-release 24 hour  30 each 0     Sig: Take 1 tablet by mouth Daily.        Pharmacy where request should be sent: [id number] - [place], [place] - [address] [place] [facility] [place] [address]  - [phone]  - [phone] FX     Last office visit with prescribing clinician: 1/14/2025   Last telemedicine visit with prescribing clinician: Visit date not found   Next office visit with prescribing clinician: 4/8/2025     Does the patient have less than a 3 day supply:  [] Yes  [x] No    Mahogany Hightower Rep   03/11/25 14:28 EDT

## 2025-04-08 ENCOUNTER — OFFICE VISIT (OUTPATIENT)
Dept: PAIN MEDICINE | Facility: CLINIC | Age: 70
End: 2025-04-08
Payer: COMMERCIAL

## 2025-04-08 VITALS
WEIGHT: 165.1 LBS | DIASTOLIC BLOOD PRESSURE: 87 MMHG | RESPIRATION RATE: 16 BRPM | HEART RATE: 71 BPM | OXYGEN SATURATION: 98 % | SYSTOLIC BLOOD PRESSURE: 139 MMHG | BODY MASS INDEX: 24.38 KG/M2

## 2025-04-08 DIAGNOSIS — M79.605 LEG PAIN, BILATERAL: Primary | ICD-10-CM

## 2025-04-08 DIAGNOSIS — M53.3 SACROILIAC DYSFUNCTION: ICD-10-CM

## 2025-04-08 DIAGNOSIS — Z79.899 OTHER LONG TERM (CURRENT) DRUG THERAPY: ICD-10-CM

## 2025-04-08 DIAGNOSIS — M79.604 LEG PAIN, BILATERAL: Primary | ICD-10-CM

## 2025-04-08 DIAGNOSIS — G62.9 PERIPHERAL POLYNEUROPATHY: ICD-10-CM

## 2025-04-08 DIAGNOSIS — M70.62 GREATER TROCHANTERIC BURSITIS OF BOTH HIPS: ICD-10-CM

## 2025-04-08 DIAGNOSIS — M70.61 GREATER TROCHANTERIC BURSITIS OF BOTH HIPS: ICD-10-CM

## 2025-04-08 DIAGNOSIS — M79.671 PAIN IN BOTH FEET: ICD-10-CM

## 2025-04-08 DIAGNOSIS — R39.89 BLADDER PAIN: ICD-10-CM

## 2025-04-08 DIAGNOSIS — M79.672 PAIN IN BOTH FEET: ICD-10-CM

## 2025-04-08 RX ORDER — CLOPIDOGREL BISULFATE 75 MG/1
75 TABLET ORAL
COMMUNITY
Start: 2025-02-20

## 2025-04-08 RX ORDER — HYDROCODONE BITARTRATE 40 MG/1
1 TABLET, EXTENDED RELEASE ORAL DAILY
Qty: 30 EACH | Refills: 0 | Status: SHIPPED | OUTPATIENT
Start: 2025-04-08

## 2025-04-08 RX ORDER — HYDROXYZINE PAMOATE 50 MG/1
50 CAPSULE ORAL
COMMUNITY
Start: 2025-02-20

## 2025-04-08 RX ORDER — ASPIRIN 81 MG/1
81 TABLET ORAL
COMMUNITY
Start: 2025-02-20

## 2025-04-08 RX ORDER — ISOSORBIDE MONONITRATE 30 MG/1
30 TABLET, EXTENDED RELEASE ORAL DAILY
COMMUNITY
Start: 2025-03-21 | End: 2026-03-21

## 2025-04-08 RX ORDER — ATORVASTATIN CALCIUM 40 MG/1
40 TABLET, FILM COATED ORAL
COMMUNITY
Start: 2025-02-20

## 2025-04-08 RX ORDER — BUSPIRONE HYDROCHLORIDE 5 MG/1
TABLET ORAL
COMMUNITY
Start: 2025-02-19

## 2025-04-08 NOTE — PROGRESS NOTES
Subjective   Angella Srivastava is a 69 y.o. female.     History of Present Illness  Bilateral feet pain, also posterior leg pain, LLE > RLE. Dx with peripheral neuropathy in 2005, 10/10 at worst, 5/10 at best, burning, cold, pins & needles, always present, varies, worst in morning and at night, interferes with all activities that involve walking. Had NCS with PN. Started on Darvocet in past, taking Norco 10/325mg about QID prn with benefit at initial visit. Lyrica helped but caused severe cramping, taking Gabapentin 600mg QID at initial visit, also Cymbalta. Referred for pain management. Initially continued Norco, Gabapentin at 1200mg TID. Norco not lasting 6 hours, started Zohydro 20mg BID, then 30mg BID, not available, restarted Norco 10mg QID prn, then Hysingla 40mg qdaily. Started CBD oil, helps thumb pain.    Leg Pain   Associated symptoms include numbness.   Pain  Associated symptoms include arthralgias, nausea, numbness and weakness. Pertinent negatives include no abdominal pain, chest pain, chills, fatigue, fever, joint swelling, myalgias, neck pain or vomiting.   Back Pain  Associated symptoms include leg pain, numbness and weakness. Pertinent negatives include no abdominal pain, bladder incontinence, chest pain or fever.        The following portions of the patient's history were reviewed and updated as appropriate: allergies, current medications, past family history, past medical history, past social history, past surgical history and problem list.    Review of Systems   Constitutional:  Negative for chills, fatigue and fever.   HENT:  Negative for hearing loss and trouble swallowing.    Eyes:  Negative for visual disturbance.   Respiratory:  Negative for shortness of breath.    Cardiovascular:  Negative for chest pain.   Gastrointestinal:  Positive for nausea. Negative for abdominal pain, constipation, diarrhea and vomiting.   Genitourinary:  Negative for urinary incontinence.   Musculoskeletal:  Positive  for arthralgias and back pain. Negative for joint swelling, myalgias and neck pain.   Neurological:  Positive for weakness and numbness. Negative for dizziness and headache.       Objective   Physical Exam   Constitutional: She is oriented to person, place, and time. She appears well-developed and well-nourished.   HENT:   Head: Normocephalic and atraumatic.   Eyes: Pupils are equal, round, and reactive to light.   Cardiovascular: Normal rate, regular rhythm and normal heart sounds.   Pulmonary/Chest: Breath sounds normal.   Abdominal: Soft. Bowel sounds are normal. She exhibits no distension. There is no abdominal tenderness.   Neurological: She is alert and oriented to person, place, and time. She has normal reflexes. She displays normal reflexes. A sensory deficit is present.   Decreased in stocking distribution up to mid-calf b/l   Psychiatric: Her behavior is normal. Thought content normal.         Assessment & Plan   Diagnoses and all orders for this visit:    1. Leg pain, bilateral (Primary)    2. Bladder pain    3. Greater trochanteric bursitis of both hips    4. Other long term (current) drug therapy    5. Pain in both feet    6. Peripheral polyneuropathy    7. Sacroiliac dysfunction        [facility] and Martin reviewed, in order. Low risk per SOAPP. Repeat UDS 7/9/24 in order.  Stopped Norco 10/325mg QID prn, rotated to Hysingla 40mg qdaily, worked but pharmacy won't carry, rotated to generic Hydrocodone ER 20mg BID, not strong enough, increased to 30mg BID. Restarted Hysingla 40mg qdaily. Cannot tolerate Morphine, Codeine, Oxycodone. May need to restart Norco 10mg QID prn if insurance changes. Filled 3/17/25. Feels she may do well with MS-contin, may need to try if Hysingla becomes unavailable.  Patient's pain is still well-managed by current medication regimen, is doing well at this strength and dosage, therefore I will continue to prescribe unchanged as the most appropriate course of treatment.  Cont  Gabapentin 1200mg TID, cannot tolerate Lyrica, written by PCP.  Began Tizanidine 4mg qHS prn.  Began Medrol Dosepak.  Order Catapres 0.2mg BID x 5 days in case she is out of her medication per her request.  Cont RxAlt #2 cream, helps.  Cont Cymbalta.  Began Phenergan 25mg TID prn.  Began Licart qdaily prn to right lower back, failed OTC Ibuprofen and Naproxen.   [facility] records unavailable. [facility]   RTC 3 months for f/u. Will send 3rd script in 2 months without an appt, others to [facility]. Stable. Cannot fill on Sundays.

## 2025-06-05 DIAGNOSIS — M79.604 LEG PAIN, BILATERAL: ICD-10-CM

## 2025-06-05 DIAGNOSIS — M79.605 LEG PAIN, BILATERAL: ICD-10-CM

## 2025-06-05 RX ORDER — HYDROCODONE BITARTRATE 40 MG/1
1 TABLET, EXTENDED RELEASE ORAL DAILY
Qty: 30 EACH | Refills: 0 | Status: SHIPPED | OUTPATIENT
Start: 2025-06-05

## 2025-07-08 ENCOUNTER — OFFICE VISIT (OUTPATIENT)
Dept: PAIN MEDICINE | Facility: CLINIC | Age: 70
End: 2025-07-08
Payer: COMMERCIAL

## 2025-07-08 VITALS
OXYGEN SATURATION: 98 % | WEIGHT: 161 LBS | RESPIRATION RATE: 16 BRPM | SYSTOLIC BLOOD PRESSURE: 130 MMHG | HEART RATE: 80 BPM | BODY MASS INDEX: 23.78 KG/M2 | DIASTOLIC BLOOD PRESSURE: 93 MMHG

## 2025-07-08 DIAGNOSIS — M53.3 SACROILIAC DYSFUNCTION: ICD-10-CM

## 2025-07-08 DIAGNOSIS — M79.671 PAIN IN BOTH FEET: ICD-10-CM

## 2025-07-08 DIAGNOSIS — G62.9 PERIPHERAL POLYNEUROPATHY: ICD-10-CM

## 2025-07-08 DIAGNOSIS — M70.62 GREATER TROCHANTERIC BURSITIS OF BOTH HIPS: ICD-10-CM

## 2025-07-08 DIAGNOSIS — M79.604 LEG PAIN, BILATERAL: Primary | ICD-10-CM

## 2025-07-08 DIAGNOSIS — M79.605 LEG PAIN, BILATERAL: Primary | ICD-10-CM

## 2025-07-08 DIAGNOSIS — R39.89 BLADDER PAIN: ICD-10-CM

## 2025-07-08 DIAGNOSIS — Z79.899 OTHER LONG TERM (CURRENT) DRUG THERAPY: ICD-10-CM

## 2025-07-08 DIAGNOSIS — M70.61 GREATER TROCHANTERIC BURSITIS OF BOTH HIPS: ICD-10-CM

## 2025-07-08 DIAGNOSIS — M79.672 PAIN IN BOTH FEET: ICD-10-CM

## 2025-07-08 PROCEDURE — 99214 OFFICE O/P EST MOD 30 MIN: CPT | Performed by: PHYSICAL MEDICINE & REHABILITATION

## 2025-07-08 RX ORDER — OMEPRAZOLE 40 MG/1
CAPSULE, DELAYED RELEASE ORAL
COMMUNITY
Start: 2025-05-21

## 2025-07-08 RX ORDER — HYDROCODONE BITARTRATE 40 MG/1
1 TABLET, EXTENDED RELEASE ORAL DAILY
Qty: 30 EACH | Refills: 0 | Status: SHIPPED | OUTPATIENT
Start: 2025-07-08

## 2025-07-08 RX ORDER — GABAPENTIN 600 MG/1
1200 TABLET ORAL 3 TIMES DAILY
Qty: 540 TABLET | Refills: 1 | Status: SHIPPED | OUTPATIENT
Start: 2025-07-08

## 2025-07-08 RX ORDER — LOSARTAN POTASSIUM 50 MG/1
50 TABLET ORAL DAILY
COMMUNITY
Start: 2025-02-17 | End: 2026-02-12

## 2025-07-08 NOTE — PROGRESS NOTES
Subjective   Angella Srivastava is a 70 y.o. female.     History of Present Illness  Bilateral feet pain, also posterior leg pain, LLE > RLE. Dx with peripheral neuropathy in 2005, 10/10 at worst, 5/10 at best, burning, cold, pins & needles, always present, varies, worst in morning and at night, interferes with all activities that involve walking. Had NCS with PN. Started on Darvocet in past, taking Norco 10/325mg about QID prn with benefit at initial visit. Lyrica helped but caused severe cramping, taking Gabapentin 600mg QID at initial visit, also Cymbalta. Referred for pain management. Initially continued Norco, Gabapentin at 1200mg TID. Norco not lasting 6 hours, started Zohydro 20mg BID, then 30mg BID, not available, restarted Norco 10mg QID prn, then Hysingla 40mg qdaily. Started CBD oil, helps thumb pain.    Leg Pain   Associated symptoms include numbness.   Peripheral Neuropathy  Symptoms: nausea, numbness and weakness    Symptoms: no abdominal pain, no chest pain, no chills, no fatigue, no fever, no myalgias, no neck pain and no vomiting    Back Pain  Associated symptoms: leg pain, numbness and weakness    Associated symptoms: no abdominal pain, no bladder incontinence, no chest pain and no fever    Pain  Associated symptoms include arthralgias, nausea, numbness and weakness. Pertinent negatives include no abdominal pain, chest pain, chills, fatigue, fever, joint swelling, myalgias, neck pain or vomiting.        The following portions of the patient's history were reviewed and updated as appropriate: allergies, current medications, past family history, past medical history, past social history, past surgical history and problem list.    Review of Systems   Constitutional:  Negative for chills, fatigue and fever.   HENT:  Negative for hearing loss and trouble swallowing.    Eyes:  Negative for visual disturbance.   Respiratory:  Negative for shortness of breath.    Cardiovascular:  Negative for chest pain.    Gastrointestinal:  Positive for nausea. Negative for abdominal pain, constipation, diarrhea and vomiting.   Genitourinary:  Negative for urinary incontinence.   Musculoskeletal:  Positive for arthralgias and back pain. Negative for joint swelling, myalgias and neck pain.   Neurological:  Positive for weakness and numbness. Negative for dizziness and headache.       Objective   Physical Exam   Constitutional: She is oriented to person, place, and time. She appears well-developed and well-nourished.   HENT:   Head: Normocephalic and atraumatic.   Eyes: Pupils are equal, round, and reactive to light.   Cardiovascular: Normal rate, regular rhythm and normal heart sounds.   Pulmonary/Chest: Breath sounds normal.   Abdominal: Soft. Bowel sounds are normal. She exhibits no distension. There is no abdominal tenderness.   Neurological: She is alert and oriented to person, place, and time. She has normal reflexes. She displays normal reflexes. A sensory deficit is present.   Decreased in stocking distribution up to mid-calf b/l   Psychiatric: Her behavior is normal. Thought content normal.         Assessment & Plan   Diagnoses and all orders for this visit:    1. Leg pain, bilateral (Primary)    2. Bladder pain    3. Greater trochanteric bursitis of both hips    4. Other long term (current) drug therapy    5. Pain in both feet    6. Peripheral polyneuropathy    7. Sacroiliac dysfunction        [facility] and Martin reviewed, in order. Low risk per SOAPP. Repeat UDS 7/9/24 in order. Uses [facility].   Stopped Norco 10/325mg QID prn, rotated to Hysingla 40mg qdaily, worked but pharmacy won't carry, rotated to generic Hydrocodone ER 20mg BID, not strong enough, increased to 30mg BID. Restarted Hysingla 40mg qdaily. Cannot tolerate Morphine, Codeine, Oxycodone. May need to restart Norco 10mg QID prn if insurance changes. Filled 5/16/25. Feels she may do well with MS-contin, may need to try if Hysingla becomes unavailable.  Patient's  pain is still well-managed by current medication regimen, is doing well at this strength and dosage, therefore I will continue to prescribe unchanged as the most appropriate course of treatment.  Cont Gabapentin 1200mg TID, cannot tolerate Lyrica, written by PCP.  Began Tizanidine 4mg qHS prn.  Began Medrol Dosepak.  Order Catapres 0.2mg BID x 5 days in case she is out of her medication per her request.  Cont RxAlt #2 cream, helps.  Cont Cymbalta.  Began Phenergan 25mg TID prn.  Began Licart qdaily prn to right lower back, failed OTC Ibuprofen and Naproxen.   [facility] records unavailable. She will investigate having procedures performed at [facility] as they have a clinic in her town.   RTC 3 months for f/u. Will send 3rd script in 2 months without an appt, others to [facility]. Stable. Cannot fill on Sundays.                    Physical Exam

## 2025-07-10 ENCOUNTER — TELEPHONE (OUTPATIENT)
Dept: PAIN MEDICINE | Facility: CLINIC | Age: 70
End: 2025-07-10
Payer: COMMERCIAL

## 2025-07-10 NOTE — TELEPHONE ENCOUNTER
Caller: YOKO    Relationship: Other    Best call back number: [phone] OPTION 7     What form or medical record are you requesting: WHAT IS NEEDED IN UDS    Who is requesting this form or medical record from you: [facility]    How would you like to receive the form or medical records (pick-up, mail, fax): FAX  If fax, what is the fax number: [phone]      Timeframe paperwork needed: ASAP    Additional notes: PATIENT WAITING AT [facility] NOW

## 2025-07-11 ENCOUNTER — TELEPHONE (OUTPATIENT)
Dept: PAIN MEDICINE | Facility: CLINIC | Age: 70
End: 2025-07-11
Payer: COMMERCIAL

## 2025-07-11 RX ORDER — HYDROCODONE BITARTRATE AND ACETAMINOPHEN 10; 325 MG/1; MG/1
1 TABLET ORAL EVERY 6 HOURS PRN
Qty: 120 TABLET | Refills: 0 | Status: SHIPPED | OUTPATIENT
Start: 2025-07-11
